# Patient Record
Sex: MALE | Race: WHITE | NOT HISPANIC OR LATINO
[De-identification: names, ages, dates, MRNs, and addresses within clinical notes are randomized per-mention and may not be internally consistent; named-entity substitution may affect disease eponyms.]

---

## 2017-05-22 ENCOUNTER — APPOINTMENT (OUTPATIENT)
Dept: SURGICAL ONCOLOGY | Facility: CLINIC | Age: 57
End: 2017-05-22

## 2017-05-22 VITALS
OXYGEN SATURATION: 96 % | HEART RATE: 59 BPM | BODY MASS INDEX: 27.59 KG/M2 | WEIGHT: 215 LBS | SYSTOLIC BLOOD PRESSURE: 116 MMHG | DIASTOLIC BLOOD PRESSURE: 77 MMHG | HEIGHT: 74 IN

## 2017-06-07 ENCOUNTER — RESULT REVIEW (OUTPATIENT)
Age: 57
End: 2017-06-07

## 2017-06-07 ENCOUNTER — OUTPATIENT (OUTPATIENT)
Dept: OUTPATIENT SERVICES | Facility: HOSPITAL | Age: 57
LOS: 1 days | End: 2017-06-07
Payer: COMMERCIAL

## 2017-06-07 DIAGNOSIS — D48.5 NEOPLASM OF UNCERTAIN BEHAVIOR OF SKIN: ICD-10-CM

## 2017-06-07 LAB — SURGICAL PATHOLOGY STUDY: SIGNIFICANT CHANGE UP

## 2017-06-07 PROCEDURE — 88321 CONSLTJ&REPRT SLD PREP ELSWR: CPT

## 2017-08-02 ENCOUNTER — FORM ENCOUNTER (OUTPATIENT)
Age: 57
End: 2017-08-02

## 2017-08-03 ENCOUNTER — OUTPATIENT (OUTPATIENT)
Dept: OUTPATIENT SERVICES | Facility: HOSPITAL | Age: 57
LOS: 1 days | End: 2017-08-03
Payer: COMMERCIAL

## 2017-08-03 VITALS
HEART RATE: 62 BPM | OXYGEN SATURATION: 98 % | SYSTOLIC BLOOD PRESSURE: 110 MMHG | DIASTOLIC BLOOD PRESSURE: 70 MMHG | WEIGHT: 214.07 LBS | TEMPERATURE: 99 F | HEIGHT: 74 IN | RESPIRATION RATE: 16 BRPM

## 2017-08-03 DIAGNOSIS — K08.409 PARTIAL LOSS OF TEETH, UNSPECIFIED CAUSE, UNSPECIFIED CLASS: Chronic | ICD-10-CM

## 2017-08-03 DIAGNOSIS — L81.8 OTHER SPECIFIED DISORDERS OF PIGMENTATION: ICD-10-CM

## 2017-08-03 DIAGNOSIS — Z90.89 ACQUIRED ABSENCE OF OTHER ORGANS: Chronic | ICD-10-CM

## 2017-08-03 DIAGNOSIS — D22.9 MELANOCYTIC NEVI, UNSPECIFIED: ICD-10-CM

## 2017-08-03 DIAGNOSIS — Z01.818 ENCOUNTER FOR OTHER PREPROCEDURAL EXAMINATION: ICD-10-CM

## 2017-08-03 LAB
ALBUMIN SERPL ELPH-MCNC: 4.6 G/DL — SIGNIFICANT CHANGE UP (ref 3.3–5)
ALP SERPL-CCNC: 76 U/L — SIGNIFICANT CHANGE UP (ref 40–120)
ALT FLD-CCNC: 40 U/L — SIGNIFICANT CHANGE UP (ref 10–45)
ANION GAP SERPL CALC-SCNC: 16 MMOL/L — SIGNIFICANT CHANGE UP (ref 5–17)
AST SERPL-CCNC: 29 U/L — SIGNIFICANT CHANGE UP (ref 10–40)
BILIRUB SERPL-MCNC: 0.9 MG/DL — SIGNIFICANT CHANGE UP (ref 0.2–1.2)
BUN SERPL-MCNC: 26 MG/DL — HIGH (ref 7–23)
CALCIUM SERPL-MCNC: 9.6 MG/DL — SIGNIFICANT CHANGE UP (ref 8.4–10.5)
CHLORIDE SERPL-SCNC: 105 MMOL/L — SIGNIFICANT CHANGE UP (ref 96–108)
CO2 SERPL-SCNC: 22 MMOL/L — SIGNIFICANT CHANGE UP (ref 22–31)
CREAT SERPL-MCNC: 1.01 MG/DL — SIGNIFICANT CHANGE UP (ref 0.5–1.3)
GLUCOSE SERPL-MCNC: 96 MG/DL — SIGNIFICANT CHANGE UP (ref 70–99)
HCT VFR BLD CALC: 40.1 % — SIGNIFICANT CHANGE UP (ref 39–50)
HGB BLD-MCNC: 13.6 G/DL — SIGNIFICANT CHANGE UP (ref 13–17)
MCHC RBC-ENTMCNC: 30.6 PG — SIGNIFICANT CHANGE UP (ref 27–34)
MCHC RBC-ENTMCNC: 33.9 GM/DL — SIGNIFICANT CHANGE UP (ref 32–36)
MCV RBC AUTO: 90.3 FL — SIGNIFICANT CHANGE UP (ref 80–100)
PLATELET # BLD AUTO: 208 K/UL — SIGNIFICANT CHANGE UP (ref 150–400)
POTASSIUM SERPL-MCNC: 4.1 MMOL/L — SIGNIFICANT CHANGE UP (ref 3.5–5.3)
POTASSIUM SERPL-SCNC: 4.1 MMOL/L — SIGNIFICANT CHANGE UP (ref 3.5–5.3)
PROT SERPL-MCNC: 7.4 G/DL — SIGNIFICANT CHANGE UP (ref 6–8.3)
RBC # BLD: 4.44 M/UL — SIGNIFICANT CHANGE UP (ref 4.2–5.8)
RBC # FLD: 13.9 % — SIGNIFICANT CHANGE UP (ref 10.3–14.5)
SODIUM SERPL-SCNC: 143 MMOL/L — SIGNIFICANT CHANGE UP (ref 135–145)
WBC # BLD: 4.83 K/UL — SIGNIFICANT CHANGE UP (ref 3.8–10.5)
WBC # FLD AUTO: 4.83 K/UL — SIGNIFICANT CHANGE UP (ref 3.8–10.5)

## 2017-08-03 PROCEDURE — G0463: CPT

## 2017-08-03 PROCEDURE — 85027 COMPLETE CBC AUTOMATED: CPT

## 2017-08-03 PROCEDURE — 71046 X-RAY EXAM CHEST 2 VIEWS: CPT

## 2017-08-03 PROCEDURE — 71020: CPT | Mod: 26

## 2017-08-03 PROCEDURE — 80053 COMPREHEN METABOLIC PANEL: CPT

## 2017-08-03 RX ORDER — CELECOXIB 200 MG/1
200 CAPSULE ORAL ONCE
Qty: 0 | Refills: 0 | Status: COMPLETED | OUTPATIENT
Start: 2017-08-17 | End: 2017-08-17

## 2017-08-03 RX ORDER — ACETAMINOPHEN 500 MG
975 TABLET ORAL ONCE
Qty: 0 | Refills: 0 | Status: COMPLETED | OUTPATIENT
Start: 2017-08-17 | End: 2017-08-17

## 2017-08-03 RX ORDER — LIDOCAINE HCL 20 MG/ML
0.2 VIAL (ML) INJECTION ONCE
Qty: 0 | Refills: 0 | Status: DISCONTINUED | OUTPATIENT
Start: 2017-08-17 | End: 2017-09-01

## 2017-08-03 RX ORDER — SODIUM CHLORIDE 9 MG/ML
3 INJECTION INTRAMUSCULAR; INTRAVENOUS; SUBCUTANEOUS EVERY 8 HOURS
Qty: 0 | Refills: 0 | Status: DISCONTINUED | OUTPATIENT
Start: 2017-08-17 | End: 2017-09-01

## 2017-08-03 NOTE — H&P PST ADULT - ATTENDING COMMENTS
57-year-old man with dysplastic nevi of his left back and flank scheduled for appropriate excision with reconstruction by Dr. Pina.  This was discussed with him in my office and again the morning of surgery.  All questions answered.  Consent on chart

## 2017-08-03 NOTE — H&P PST ADULT - PMH
Arthritis  left knee  Dyslipidemia    Esophageal stricture    GERD (gastroesophageal reflux disease)    Nevus  left upper back  Peyronie's disease

## 2017-08-03 NOTE — H&P PST ADULT - NSANTHOSAYNRD_GEN_A_CORE
No. CHINO screening performed.  STOP BANG Legend: 0-2 = LOW Risk; 3-4 = INTERMEDIATE Risk; 5-8 = HIGH Risk

## 2017-08-03 NOTE — H&P PST ADULT - HISTORY OF PRESENT ILLNESS
57 year old male with h/o GERD, dyslipidemia had routine  dermatology evaluation- s/p biopsy left back lesions- scheduled for wide excision cyst plastic nevus with atypia left upper back and left flank on 08/17/2017 57 year old male with h/o GERD, dyslipidemia had routine  dermatology evaluation- s/p biopsy left back lesions- scheduled for wide excision cyst plastic nevus with atypia left upper back and left flank reconstruction on 08/17/2017

## 2017-08-03 NOTE — H&P PST ADULT - PROBLEM SELECTOR PLAN 1
Wide excision cyst plastic nevus with atypia left upper back and left flank Wide excision cyst plastic nevus with atypia left upper back and left flank reconstruction

## 2017-08-16 NOTE — ASU DISCHARGE PLAN (ADULT/PEDIATRIC). - NOTIFY
Numbness, color, or temperature change to extremity/Pain not relieved by Medications/Excessive Diarrhea/Increased Irritability or Sluggishness/Bleeding that does not stop/Fever greater than 101/Inability to Tolerate Liquids or Foods/Swelling that continues/Unable to Urinate/Persistent Nausea and Vomiting/Numbness, tingling

## 2017-08-16 NOTE — BRIEF OPERATIVE NOTE - PROCEDURE
Excision  08/16/2017  Dysplastic nevi of left back and flank with reconstruction by Dr. Silvana AHMADIEG Excision of mass  08/17/2017  left back (3 cm) with plastics (Dr Pina)  Active  MBEG

## 2017-08-16 NOTE — ASU DISCHARGE PLAN (ADULT/PEDIATRIC). - NURSING INSTRUCTIONS
stay home for next 24 hrs,  no driving,no alcohol,no smoking for 24 hrs  eat lite to start and then resume your diet. call your Dr for f/u appointment.f/u with your drs printed instructions, given.

## 2017-08-16 NOTE — ASU DISCHARGE PLAN (ADULT/PEDIATRIC). - MEDICATION SUMMARY - MEDICATIONS TO TAKE
I will START or STAY ON the medications listed below when I get home from the hospital:    Percocet 5/325 325 mg-5 mg oral tablet  -- 1-2 tab(s) by mouth every 4-6 hours, As Needed -for severe pain MDD:8  -- Caution federal law prohibits the transfer of this drug to any person other  than the person for whom it was prescribed.  May cause drowsiness.  Alcohol may intensify this effect.  Use care when operating dangerous machinery.  This prescription cannot be refilled.  This product contains acetaminophen.  Do not use  with any other product containing acetaminophen to prevent possible liver damage.  Using more of this medication than prescribed may cause serious breathing problems.    -- Indication: For Pain    atorvastatin 10 mg oral tablet  -- 1 tab(s) by mouth once a day  -- Indication: For Home medication    omeprazole 20 mg oral delayed release capsule  -- 1 cap(s) by mouth once a day  -- Indication: For Home mediication

## 2017-08-16 NOTE — BRIEF OPERATIVE NOTE - SPECIMENS
Skin and soft tissue left back and left flank Skin and soft tissue left back and left flank, left back mass

## 2017-08-16 NOTE — BRIEF OPERATIVE NOTE - PRE-OP DX
Dysplastic nevi  08/16/2017  left back and flank  Active  Beg, Eric ROJAS Dysplastic nevi  08/16/2017  left back and flank  Active  Eric Wilson  Mass of torso  08/17/2017  3 cm back mass  Active  Eric Wilson H

## 2017-08-16 NOTE — BRIEF OPERATIVE NOTE - POST-OP DX
Dysplastic nevi  08/16/2017  left back and flank  Active  Beg, Eric ROJAS Dysplastic nevi  08/16/2017  left back and flank  Active  Eric Wilson  Mass of torso  08/17/2017  3 cm left back mass  Active  Eric Wilson H

## 2017-08-16 NOTE — ASU DISCHARGE PLAN (ADULT/PEDIATRIC). - YOU WERE IN THE HOSPITAL FOR:
Excision of dysplastic nevi from left back and flank with reconstruction Excision of dysplastic nevi from left back and flank, excision left back mass,  with reconstruction (Dr Pina)

## 2017-08-16 NOTE — ASU DISCHARGE PLAN (ADULT/PEDIATRIC). - ITEMS TO FOLLOWUP WITH YOUR PHYSICIAN'S
Dr. Wilson should call with pathology report by August 28 Dr. Wilson should call with pathology report by August 28.    Please follow up with Dr. Pina within x1 week after discharge from the hospital. You may call [phone] to schedule an appointment.

## 2017-08-16 NOTE — ASU DISCHARGE PLAN (ADULT/PEDIATRIC). - SPECIAL INSTRUCTIONS
See Dr. Pina next week Resume normal diet. Avoid straining, exercise, or heavy lifting.    Take medications as instructed by prescriptions.    OK to shower in 24 hours, your dressings can get wet. Otherwise, sponge bathing is ok.     Follow-up with primary care doctor as well.     Call 911 and return to the ED for chest pain, shortness of breath, significant increase in pain, or significant change in color of surgical sites.

## 2017-08-17 ENCOUNTER — APPOINTMENT (OUTPATIENT)
Dept: SURGICAL ONCOLOGY | Facility: HOSPITAL | Age: 57
End: 2017-08-17

## 2017-08-17 ENCOUNTER — TRANSCRIPTION ENCOUNTER (OUTPATIENT)
Age: 57
End: 2017-08-17

## 2017-08-17 ENCOUNTER — RESULT REVIEW (OUTPATIENT)
Age: 57
End: 2017-08-17

## 2017-08-17 ENCOUNTER — OUTPATIENT (OUTPATIENT)
Dept: OUTPATIENT SERVICES | Facility: HOSPITAL | Age: 57
LOS: 1 days | End: 2017-08-17
Payer: COMMERCIAL

## 2017-08-17 VITALS
SYSTOLIC BLOOD PRESSURE: 110 MMHG | HEART RATE: 62 BPM | WEIGHT: 214.07 LBS | TEMPERATURE: 99 F | HEIGHT: 74 IN | OXYGEN SATURATION: 98 % | DIASTOLIC BLOOD PRESSURE: 70 MMHG | RESPIRATION RATE: 16 BRPM

## 2017-08-17 VITALS
TEMPERATURE: 98 F | DIASTOLIC BLOOD PRESSURE: 70 MMHG | RESPIRATION RATE: 16 BRPM | SYSTOLIC BLOOD PRESSURE: 121 MMHG | HEART RATE: 61 BPM | OXYGEN SATURATION: 98 %

## 2017-08-17 DIAGNOSIS — L81.8 OTHER SPECIFIED DISORDERS OF PIGMENTATION: ICD-10-CM

## 2017-08-17 DIAGNOSIS — K08.409 PARTIAL LOSS OF TEETH, UNSPECIFIED CAUSE, UNSPECIFIED CLASS: Chronic | ICD-10-CM

## 2017-08-17 DIAGNOSIS — Z01.818 ENCOUNTER FOR OTHER PREPROCEDURAL EXAMINATION: ICD-10-CM

## 2017-08-17 DIAGNOSIS — Z90.89 ACQUIRED ABSENCE OF OTHER ORGANS: Chronic | ICD-10-CM

## 2017-08-17 PROCEDURE — 88305 TISSUE EXAM BY PATHOLOGIST: CPT

## 2017-08-17 PROCEDURE — 88305 TISSUE EXAM BY PATHOLOGIST: CPT | Mod: 26

## 2017-08-17 PROCEDURE — 21930 EXC BACK LES SC < 3 CM: CPT

## 2017-08-17 PROCEDURE — 21931 EXC BACK LES SC 3 CM/>: CPT

## 2017-08-17 PROCEDURE — 11406 EXC TR-EXT B9+MARG >4.0 CM: CPT

## 2017-08-17 RX ORDER — SODIUM CHLORIDE 9 MG/ML
1000 INJECTION, SOLUTION INTRAVENOUS
Qty: 0 | Refills: 0 | Status: DISCONTINUED | OUTPATIENT
Start: 2017-08-17 | End: 2017-09-01

## 2017-08-17 RX ORDER — OXYCODONE HYDROCHLORIDE 5 MG/1
10 TABLET ORAL ONCE
Qty: 0 | Refills: 0 | Status: DISCONTINUED | OUTPATIENT
Start: 2017-08-17 | End: 2017-08-17

## 2017-08-17 RX ORDER — ONDANSETRON 8 MG/1
4 TABLET, FILM COATED ORAL ONCE
Qty: 0 | Refills: 0 | Status: DISCONTINUED | OUTPATIENT
Start: 2017-08-17 | End: 2017-09-01

## 2017-08-17 RX ORDER — OXYCODONE HYDROCHLORIDE 5 MG/1
5 TABLET ORAL ONCE
Qty: 0 | Refills: 0 | Status: DISCONTINUED | OUTPATIENT
Start: 2017-08-17 | End: 2017-08-17

## 2017-08-17 RX ORDER — CELECOXIB 200 MG/1
200 CAPSULE ORAL ONCE
Qty: 0 | Refills: 0 | Status: DISCONTINUED | OUTPATIENT
Start: 2017-08-17 | End: 2017-09-01

## 2017-08-17 RX ADMIN — CELECOXIB 200 MILLIGRAM(S): 200 CAPSULE ORAL at 06:34

## 2017-08-17 RX ADMIN — Medication 975 MILLIGRAM(S): at 06:34

## 2017-08-23 ENCOUNTER — TRANSCRIPTION ENCOUNTER (OUTPATIENT)
Age: 57
End: 2017-08-23

## 2017-08-23 LAB — SURGICAL PATHOLOGY STUDY: SIGNIFICANT CHANGE UP

## 2017-08-25 ENCOUNTER — APPOINTMENT (OUTPATIENT)
Dept: PLASTIC SURGERY | Facility: CLINIC | Age: 57
End: 2017-08-25
Payer: COMMERCIAL

## 2017-08-25 DIAGNOSIS — L81.8 OTHER SPECIFIED DISORDERS OF PIGMENTATION: ICD-10-CM

## 2017-08-25 PROCEDURE — 99024 POSTOP FOLLOW-UP VISIT: CPT

## 2017-09-01 ENCOUNTER — APPOINTMENT (OUTPATIENT)
Dept: PLASTIC SURGERY | Facility: CLINIC | Age: 57
End: 2017-09-01
Payer: COMMERCIAL

## 2017-09-01 PROCEDURE — 99024 POSTOP FOLLOW-UP VISIT: CPT

## 2017-10-05 ENCOUNTER — APPOINTMENT (OUTPATIENT)
Dept: PLASTIC SURGERY | Facility: CLINIC | Age: 57
End: 2017-10-05
Payer: COMMERCIAL

## 2017-10-05 PROCEDURE — 99024 POSTOP FOLLOW-UP VISIT: CPT

## 2017-10-18 PROBLEM — L81.8 ATYPICAL MELANOCYTIC HYPERPLASIA: Status: ACTIVE | Noted: 2017-05-22

## 2017-12-07 ENCOUNTER — APPOINTMENT (OUTPATIENT)
Dept: VASCULAR SURGERY | Facility: CLINIC | Age: 57
End: 2017-12-07

## 2018-02-08 ENCOUNTER — APPOINTMENT (OUTPATIENT)
Dept: VASCULAR SURGERY | Facility: CLINIC | Age: 58
End: 2018-02-08
Payer: COMMERCIAL

## 2018-02-08 VITALS
HEIGHT: 74 IN | BODY MASS INDEX: 26.95 KG/M2 | SYSTOLIC BLOOD PRESSURE: 102 MMHG | TEMPERATURE: 97.6 F | DIASTOLIC BLOOD PRESSURE: 69 MMHG | WEIGHT: 210 LBS | HEART RATE: 71 BPM

## 2018-02-08 PROCEDURE — 93970 EXTREMITY STUDY: CPT

## 2018-02-08 PROCEDURE — 99203 OFFICE O/P NEW LOW 30 MIN: CPT | Mod: 25

## 2018-04-18 ENCOUNTER — APPOINTMENT (OUTPATIENT)
Dept: VASCULAR SURGERY | Facility: CLINIC | Age: 58
End: 2018-04-18
Payer: COMMERCIAL

## 2018-04-18 PROCEDURE — 37765 STAB PHLEB VEINS XTR 10-20: CPT | Mod: RT

## 2018-04-18 PROCEDURE — 36478 ENDOVENOUS LASER 1ST VEIN: CPT | Mod: RT

## 2018-04-20 ENCOUNTER — APPOINTMENT (OUTPATIENT)
Dept: VASCULAR SURGERY | Facility: CLINIC | Age: 58
End: 2018-04-20
Payer: COMMERCIAL

## 2018-04-20 PROCEDURE — 93971 EXTREMITY STUDY: CPT

## 2018-04-26 ENCOUNTER — APPOINTMENT (OUTPATIENT)
Dept: VASCULAR SURGERY | Facility: CLINIC | Age: 58
End: 2018-04-26
Payer: COMMERCIAL

## 2018-04-26 VITALS
HEART RATE: 67 BPM | DIASTOLIC BLOOD PRESSURE: 78 MMHG | SYSTOLIC BLOOD PRESSURE: 117 MMHG | WEIGHT: 210 LBS | HEIGHT: 74 IN | BODY MASS INDEX: 26.95 KG/M2 | TEMPERATURE: 98.2 F

## 2018-04-26 PROCEDURE — 99024 POSTOP FOLLOW-UP VISIT: CPT

## 2018-04-26 PROCEDURE — 93971 EXTREMITY STUDY: CPT

## 2018-05-10 ENCOUNTER — APPOINTMENT (OUTPATIENT)
Dept: VASCULAR SURGERY | Facility: CLINIC | Age: 58
End: 2018-05-10

## 2018-07-12 ENCOUNTER — APPOINTMENT (OUTPATIENT)
Dept: VASCULAR SURGERY | Facility: CLINIC | Age: 58
End: 2018-07-12

## 2018-07-23 PROBLEM — D22.9 MELANOCYTIC NEVI, UNSPECIFIED: Chronic | Status: ACTIVE | Noted: 2017-08-03

## 2018-07-23 PROBLEM — E78.5 HYPERLIPIDEMIA, UNSPECIFIED: Chronic | Status: ACTIVE | Noted: 2017-08-03

## 2018-09-27 ENCOUNTER — APPOINTMENT (OUTPATIENT)
Dept: VASCULAR SURGERY | Facility: CLINIC | Age: 58
End: 2018-09-27
Payer: COMMERCIAL

## 2018-09-27 VITALS
WEIGHT: 210 LBS | DIASTOLIC BLOOD PRESSURE: 70 MMHG | HEART RATE: 72 BPM | TEMPERATURE: 98.1 F | HEIGHT: 74 IN | SYSTOLIC BLOOD PRESSURE: 114 MMHG | BODY MASS INDEX: 26.95 KG/M2

## 2018-09-27 PROCEDURE — 99212 OFFICE O/P EST SF 10 MIN: CPT

## 2019-01-20 ENCOUNTER — TRANSCRIPTION ENCOUNTER (OUTPATIENT)
Age: 59
End: 2019-01-20

## 2019-03-29 ENCOUNTER — TRANSCRIPTION ENCOUNTER (OUTPATIENT)
Age: 59
End: 2019-03-29

## 2019-10-21 ENCOUNTER — APPOINTMENT (OUTPATIENT)
Dept: VASCULAR SURGERY | Facility: CLINIC | Age: 59
End: 2019-10-21
Payer: COMMERCIAL

## 2019-10-21 VITALS
HEIGHT: 74 IN | WEIGHT: 205 LBS | BODY MASS INDEX: 26.31 KG/M2 | SYSTOLIC BLOOD PRESSURE: 102 MMHG | TEMPERATURE: 97.5 F | HEART RATE: 67 BPM | DIASTOLIC BLOOD PRESSURE: 61 MMHG

## 2019-10-21 DIAGNOSIS — I83.893 VARICOSE VEINS OF BILATERAL LOWER EXTREMITIES WITH OTHER COMPLICATIONS: ICD-10-CM

## 2019-10-21 PROCEDURE — 99213 OFFICE O/P EST LOW 20 MIN: CPT

## 2019-10-21 PROCEDURE — 93970 EXTREMITY STUDY: CPT

## 2019-10-21 RX ORDER — HYDROCODONE BITARTRATE AND ACETAMINOPHEN 5; 325 MG/1; MG/1
5-325 TABLET ORAL
Qty: 8 | Refills: 0 | Status: DISCONTINUED | COMMUNITY
Start: 2017-07-27 | End: 2019-10-21

## 2019-10-21 RX ORDER — IBUPROFEN 800 MG/1
800 TABLET, FILM COATED ORAL
Qty: 20 | Refills: 0 | Status: DISCONTINUED | COMMUNITY
Start: 2017-07-27 | End: 2019-10-21

## 2019-10-21 RX ORDER — BENZONATATE 100 MG/1
100 CAPSULE ORAL
Qty: 90 | Refills: 0 | Status: DISCONTINUED | COMMUNITY
Start: 2017-05-22 | End: 2019-10-21

## 2019-10-21 RX ORDER — SODIUM SULFATE, POTASSIUM SULFATE, MAGNESIUM SULFATE 17.5; 3.13; 1.6 G/ML; G/ML; G/ML
17.5-3.13-1.6 SOLUTION, CONCENTRATE ORAL
Qty: 354 | Refills: 0 | Status: DISCONTINUED | COMMUNITY
Start: 2017-07-21 | End: 2019-10-21

## 2019-10-21 RX ORDER — OMEPRAZOLE 20 MG/1
20 CAPSULE, DELAYED RELEASE ORAL
Qty: 90 | Refills: 0 | Status: DISCONTINUED | COMMUNITY
Start: 2017-07-21 | End: 2019-10-21

## 2019-10-21 RX ORDER — AZITHROMYCIN 250 MG/1
250 TABLET, FILM COATED ORAL
Qty: 6 | Refills: 0 | Status: DISCONTINUED | COMMUNITY
Start: 2017-05-22 | End: 2019-10-21

## 2019-10-21 RX ORDER — OXYCODONE AND ACETAMINOPHEN 5; 325 MG/1; MG/1
5-325 TABLET ORAL
Qty: 20 | Refills: 0 | Status: DISCONTINUED | COMMUNITY
Start: 2017-08-17 | End: 2019-10-21

## 2019-10-21 NOTE — PHYSICAL EXAM
[2+] : left 2+ [Varicose Veins Of The Right Leg] : of the right leg [Varicose Veins Of Lower Extremities] : present [] : bilaterally [Ankle Swelling On The Right] : mild [No Rash or Lesion] : No rash or lesion [Alert] : alert [Oriented to Place] : oriented to place [Oriented to Person] : oriented to person [Oriented to Time] : oriented to time [Calm] : calm [Ankle Swelling (On Exam)] : not present [de-identified] : well in NAD  [FreeTextEntry1] : Bilateral lower extremities w/ diffuse spider and reticular veins. Right leg very mild non tender varicosities in posterior calf. Well healed stab incisions. No open wounds.  [de-identified] : MALISSAL [de-identified] : Cooperative

## 2019-10-21 NOTE — ASSESSMENT
[Arterial/Venous Disease] : arterial/venous disease [Other: _____] : [unfilled] [FreeTextEntry1] : 58 y/o m w/ venous insufficiency s/p right leg GSV ablation an stab phlebectomies doing well w/out any complaints. \par \par Medical Conservative Management leg elevation prn, diet and weight loss, skin moisturizer and knee high compression stocking 20-30mmHg \par He may return to the office on an as needed basis or sooner if he develops any new symptoms

## 2019-10-21 NOTE — DATA REVIEWED
[FreeTextEntry1] : 10/21/19 Venous Doppler: Bilateral negative DVT/SVT. Right GSV ablated. Reflux in vein of giacomini/SSV, 3.4mm at the prox. SSV not seen in mid-prox calf. Left no reflux found.

## 2019-10-21 NOTE — HISTORY OF PRESENT ILLNESS
[FreeTextEntry1] : 60 y/o m w/ known venous insufficiency s/p right GSV ablation and stab phlebectomies in April 2018. He is doing well and denies any new complaints. No new medical problems since previous visit. Partially compliant with compression stockings. Denies pain or leg swelling. Denies claudication.

## 2019-12-10 NOTE — ASU DISCHARGE PLAN (ADULT/PEDIATRIC). - TELE NUMBER
Dr Wilson:[phone]
FAMILY HISTORY:  Family history of DVT, PE father  FH: breast cancer, maternal grandmother  FH: diabetes mellitus, paternal grandmother  FH: heart disease, maternal grandparents

## 2020-07-29 ENCOUNTER — TRANSCRIPTION ENCOUNTER (OUTPATIENT)
Age: 60
End: 2020-07-29

## 2020-10-10 ENCOUNTER — TRANSCRIPTION ENCOUNTER (OUTPATIENT)
Age: 60
End: 2020-10-10

## 2021-02-17 ENCOUNTER — INPATIENT (INPATIENT)
Facility: HOSPITAL | Age: 61
LOS: 1 days | Discharge: HOME | DRG: 299 | End: 2021-02-19
Attending: INTERNAL MEDICINE | Admitting: INTERNAL MEDICINE
Payer: COMMERCIAL

## 2021-02-17 VITALS — HEIGHT: 74 IN | WEIGHT: 210.1 LBS | OXYGEN SATURATION: 92 % | HEART RATE: 97 BPM

## 2021-02-17 DIAGNOSIS — I82.411 ACUTE EMBOLISM AND THROMBOSIS OF RIGHT FEMORAL VEIN: ICD-10-CM

## 2021-02-17 DIAGNOSIS — N48.6 INDURATION PENIS PLASTICA: ICD-10-CM

## 2021-02-17 DIAGNOSIS — M13.862 OTHER SPECIFIED ARTHRITIS, LEFT KNEE: ICD-10-CM

## 2021-02-17 DIAGNOSIS — I26.93 SINGLE SUBSEGMENTAL PULMONARY EMBOLISM WITHOUT ACUTE COR PULMONALE: ICD-10-CM

## 2021-02-17 DIAGNOSIS — E78.5 HYPERLIPIDEMIA, UNSPECIFIED: ICD-10-CM

## 2021-02-17 DIAGNOSIS — R73.9 HYPERGLYCEMIA, UNSPECIFIED: ICD-10-CM

## 2021-02-17 DIAGNOSIS — I26.99 OTHER PULMONARY EMBOLISM WITHOUT ACUTE COR PULMONALE: ICD-10-CM

## 2021-02-17 DIAGNOSIS — Z90.89 ACQUIRED ABSENCE OF OTHER ORGANS: Chronic | ICD-10-CM

## 2021-02-17 DIAGNOSIS — K21.9 GASTRO-ESOPHAGEAL REFLUX DISEASE WITHOUT ESOPHAGITIS: ICD-10-CM

## 2021-02-17 DIAGNOSIS — E78.00 PURE HYPERCHOLESTEROLEMIA, UNSPECIFIED: ICD-10-CM

## 2021-02-17 DIAGNOSIS — N40.0 BENIGN PROSTATIC HYPERPLASIA WITHOUT LOWER URINARY TRACT SYMPTOMS: ICD-10-CM

## 2021-02-17 DIAGNOSIS — S82.891D OTHER FRACTURE OF RIGHT LOWER LEG, SUBSEQUENT ENCOUNTER FOR CLOSED FRACTURE WITH ROUTINE HEALING: ICD-10-CM

## 2021-02-17 DIAGNOSIS — K08.409 PARTIAL LOSS OF TEETH, UNSPECIFIED CAUSE, UNSPECIFIED CLASS: Chronic | ICD-10-CM

## 2021-02-17 LAB
ADD ON TEST-SPECIMEN IN LAB: SIGNIFICANT CHANGE UP
ALBUMIN SERPL ELPH-MCNC: 3.5 G/DL — SIGNIFICANT CHANGE UP (ref 3.3–5)
ALP SERPL-CCNC: 121 U/L — HIGH (ref 40–120)
ALT FLD-CCNC: 149 U/L — HIGH (ref 12–78)
ANION GAP SERPL CALC-SCNC: 7 MMOL/L — SIGNIFICANT CHANGE UP (ref 5–17)
APTT BLD: 31.7 SEC — SIGNIFICANT CHANGE UP (ref 27.5–35.5)
AST SERPL-CCNC: 76 U/L — HIGH (ref 15–37)
BASOPHILS # BLD AUTO: 0.05 K/UL — SIGNIFICANT CHANGE UP (ref 0–0.2)
BASOPHILS NFR BLD AUTO: 0.4 % — SIGNIFICANT CHANGE UP (ref 0–2)
BILIRUB SERPL-MCNC: 1 MG/DL — SIGNIFICANT CHANGE UP (ref 0.2–1.2)
BUN SERPL-MCNC: 16 MG/DL — SIGNIFICANT CHANGE UP (ref 7–23)
CALCIUM SERPL-MCNC: 9.1 MG/DL — SIGNIFICANT CHANGE UP (ref 8.5–10.1)
CHLORIDE SERPL-SCNC: 100 MMOL/L — SIGNIFICANT CHANGE UP (ref 96–108)
CO2 SERPL-SCNC: 28 MMOL/L — SIGNIFICANT CHANGE UP (ref 22–31)
CREAT SERPL-MCNC: 1.01 MG/DL — SIGNIFICANT CHANGE UP (ref 0.5–1.3)
D DIMER BLD IA.RAPID-MCNC: 1274 NG/ML DDU — HIGH
EOSINOPHIL # BLD AUTO: 0.06 K/UL — SIGNIFICANT CHANGE UP (ref 0–0.5)
EOSINOPHIL NFR BLD AUTO: 0.5 % — SIGNIFICANT CHANGE UP (ref 0–6)
GLUCOSE SERPL-MCNC: 112 MG/DL — HIGH (ref 70–99)
HCT VFR BLD CALC: 42.7 % — SIGNIFICANT CHANGE UP (ref 39–50)
HGB BLD-MCNC: 14.5 G/DL — SIGNIFICANT CHANGE UP (ref 13–17)
IMM GRANULOCYTES NFR BLD AUTO: 0.3 % — SIGNIFICANT CHANGE UP (ref 0–1.5)
INR BLD: 1.33 RATIO — HIGH (ref 0.88–1.16)
LYMPHOCYTES # BLD AUTO: 0.87 K/UL — LOW (ref 1–3.3)
LYMPHOCYTES # BLD AUTO: 7.2 % — LOW (ref 13–44)
MCHC RBC-ENTMCNC: 31.7 PG — SIGNIFICANT CHANGE UP (ref 27–34)
MCHC RBC-ENTMCNC: 34 GM/DL — SIGNIFICANT CHANGE UP (ref 32–36)
MCV RBC AUTO: 93.2 FL — SIGNIFICANT CHANGE UP (ref 80–100)
MONOCYTES # BLD AUTO: 1.1 K/UL — HIGH (ref 0–0.9)
MONOCYTES NFR BLD AUTO: 9.1 % — SIGNIFICANT CHANGE UP (ref 2–14)
NEUTROPHILS # BLD AUTO: 9.92 K/UL — HIGH (ref 1.8–7.4)
NEUTROPHILS NFR BLD AUTO: 82.5 % — HIGH (ref 43–77)
PLATELET # BLD AUTO: 272 K/UL — SIGNIFICANT CHANGE UP (ref 150–400)
POTASSIUM SERPL-MCNC: 3.9 MMOL/L — SIGNIFICANT CHANGE UP (ref 3.5–5.3)
POTASSIUM SERPL-SCNC: 3.9 MMOL/L — SIGNIFICANT CHANGE UP (ref 3.5–5.3)
PROT SERPL-MCNC: 8.2 GM/DL — SIGNIFICANT CHANGE UP (ref 6–8.3)
PROTHROM AB SERPL-ACNC: 15.3 SEC — HIGH (ref 10.6–13.6)
RBC # BLD: 4.58 M/UL — SIGNIFICANT CHANGE UP (ref 4.2–5.8)
RBC # FLD: 13.2 % — SIGNIFICANT CHANGE UP (ref 10.3–14.5)
SODIUM SERPL-SCNC: 135 MMOL/L — SIGNIFICANT CHANGE UP (ref 135–145)
TROPONIN I SERPL-MCNC: <0.015 NG/ML — SIGNIFICANT CHANGE UP (ref 0.01–0.04)
WBC # BLD: 12.04 K/UL — HIGH (ref 3.8–10.5)
WBC # FLD AUTO: 12.04 K/UL — HIGH (ref 3.8–10.5)

## 2021-02-17 PROCEDURE — 99497 ADVNCD CARE PLAN 30 MIN: CPT | Mod: 25

## 2021-02-17 PROCEDURE — 93010 ELECTROCARDIOGRAM REPORT: CPT

## 2021-02-17 PROCEDURE — 93306 TTE W/DOPPLER COMPLETE: CPT

## 2021-02-17 PROCEDURE — 87521 HEPATITIS C PROBE&RVRS TRNSC: CPT

## 2021-02-17 PROCEDURE — U0005: CPT

## 2021-02-17 PROCEDURE — 80053 COMPREHEN METABOLIC PANEL: CPT

## 2021-02-17 PROCEDURE — 84484 ASSAY OF TROPONIN QUANT: CPT

## 2021-02-17 PROCEDURE — 83735 ASSAY OF MAGNESIUM: CPT

## 2021-02-17 PROCEDURE — 85025 COMPLETE CBC W/AUTO DIFF WBC: CPT

## 2021-02-17 PROCEDURE — 93970 EXTREMITY STUDY: CPT | Mod: 26

## 2021-02-17 PROCEDURE — 86769 SARS-COV-2 COVID-19 ANTIBODY: CPT

## 2021-02-17 PROCEDURE — 85027 COMPLETE CBC AUTOMATED: CPT

## 2021-02-17 PROCEDURE — U0003: CPT

## 2021-02-17 PROCEDURE — 36415 COLL VENOUS BLD VENIPUNCTURE: CPT

## 2021-02-17 PROCEDURE — 99223 1ST HOSP IP/OBS HIGH 75: CPT

## 2021-02-17 PROCEDURE — 85730 THROMBOPLASTIN TIME PARTIAL: CPT

## 2021-02-17 PROCEDURE — 71275 CT ANGIOGRAPHY CHEST: CPT | Mod: 26

## 2021-02-17 PROCEDURE — 86803 HEPATITIS C AB TEST: CPT

## 2021-02-17 PROCEDURE — 84100 ASSAY OF PHOSPHORUS: CPT

## 2021-02-17 RX ORDER — OMEPRAZOLE 10 MG/1
1 CAPSULE, DELAYED RELEASE ORAL
Qty: 0 | Refills: 0 | DISCHARGE

## 2021-02-17 RX ORDER — HEPARIN SODIUM 5000 [USP'U]/ML
INJECTION INTRAVENOUS; SUBCUTANEOUS
Qty: 25000 | Refills: 0 | Status: DISCONTINUED | OUTPATIENT
Start: 2021-02-17 | End: 2021-02-18

## 2021-02-17 RX ORDER — HEPARIN SODIUM 5000 [USP'U]/ML
8000 INJECTION INTRAVENOUS; SUBCUTANEOUS EVERY 6 HOURS
Refills: 0 | Status: DISCONTINUED | OUTPATIENT
Start: 2021-02-17 | End: 2021-02-18

## 2021-02-17 RX ORDER — ACETAMINOPHEN 500 MG
650 TABLET ORAL EVERY 4 HOURS
Refills: 0 | Status: DISCONTINUED | OUTPATIENT
Start: 2021-02-17 | End: 2021-02-19

## 2021-02-17 RX ORDER — HEPARIN SODIUM 5000 [USP'U]/ML
4000 INJECTION INTRAVENOUS; SUBCUTANEOUS EVERY 6 HOURS
Refills: 0 | Status: DISCONTINUED | OUTPATIENT
Start: 2021-02-17 | End: 2021-02-18

## 2021-02-17 RX ORDER — ATORVASTATIN CALCIUM 80 MG/1
10 TABLET, FILM COATED ORAL AT BEDTIME
Refills: 0 | Status: DISCONTINUED | OUTPATIENT
Start: 2021-02-17 | End: 2021-02-19

## 2021-02-17 RX ORDER — HEPARIN SODIUM 5000 [USP'U]/ML
8000 INJECTION INTRAVENOUS; SUBCUTANEOUS ONCE
Refills: 0 | Status: COMPLETED | OUTPATIENT
Start: 2021-02-17 | End: 2021-02-17

## 2021-02-17 RX ORDER — FAMOTIDINE 10 MG/ML
10 INJECTION INTRAVENOUS DAILY
Refills: 0 | Status: DISCONTINUED | OUTPATIENT
Start: 2021-02-17 | End: 2021-02-19

## 2021-02-17 RX ORDER — TAMSULOSIN HYDROCHLORIDE 0.4 MG/1
0.8 CAPSULE ORAL AT BEDTIME
Refills: 0 | Status: DISCONTINUED | OUTPATIENT
Start: 2021-02-17 | End: 2021-02-19

## 2021-02-17 RX ORDER — SODIUM CHLORIDE 9 MG/ML
1000 INJECTION INTRAMUSCULAR; INTRAVENOUS; SUBCUTANEOUS ONCE
Refills: 0 | Status: COMPLETED | OUTPATIENT
Start: 2021-02-17 | End: 2021-02-17

## 2021-02-17 RX ADMIN — HEPARIN SODIUM 1800 UNIT(S)/HR: 5000 INJECTION INTRAVENOUS; SUBCUTANEOUS at 20:47

## 2021-02-17 RX ADMIN — SODIUM CHLORIDE 1000 MILLILITER(S): 9 INJECTION INTRAMUSCULAR; INTRAVENOUS; SUBCUTANEOUS at 18:45

## 2021-02-17 RX ADMIN — HEPARIN SODIUM 8000 UNIT(S): 5000 INJECTION INTRAVENOUS; SUBCUTANEOUS at 20:46

## 2021-02-17 RX ADMIN — SODIUM CHLORIDE 1000 MILLILITER(S): 9 INJECTION INTRAMUSCULAR; INTRAVENOUS; SUBCUTANEOUS at 16:42

## 2021-02-17 NOTE — ED STATDOCS - CRITICAL CARE ATTENDING CONTRIBUTION TO CARE
I, Aarti Phillips DO,  performed the initial face to face bedside interview with this patient regarding history of present illness, review of symptoms and relevant past medical, social and family history.  I completed an independent physical examination.  I was the initial provider who evaluated this patient. I have signed out the follow up of any pending tests (i.e. labs, radiological studies) to the ACP.  I have communicated the patient’s plan of care and disposition with the ACP.  The history, relevant review of systems, past medical and surgical history, medical decision making, and physical examination was documented by the scribe in my presence and I attest to the accuracy of the documentation.

## 2021-02-17 NOTE — ED STATDOCS - OBJECTIVE STATEMENT
61 y/o male with PMHx of HLD, Peyronie's disease, arthritis, GERD, esophageal stricture, shingles presents to the ED c/o chest pain. Pt states on 2/1, he broke his right ankle, was placed in boot at that time, with subsequent swelling. x4 days ago pt has his first COVID swab, began feeling left sided flank/chest pain which was pleuritic, and pain to the back of his right knee. Today, pt c/o SOB, decreased PO intake, dizziness. Non-smoker. Allergic to Levaquin. PMD: Dr. Troncoso, sent in today ro r/o DVT. 59 y/o male with PMHx of HLD, Peyronie's disease, arthritis, GERD, esophageal stricture, shingles presents to the ED c/o chest pain. Pt states on 2/1, he broke his right ankle, was placed in boot at that time, with subsequent swelling. x4 days ago pt has his first COVID swab, began feeling right sided flank/chest pain which was pleuritic, and pain to the back of his right knee. Today, pt c/o SOB, decreased PO intake, dizziness. Non-smoker. Allergic to Levaquin. PMD: Dr. Troncoso, sent in today ro r/o DVT. Orthopedist: Dr. Gale.

## 2021-02-17 NOTE — ED ADULT NURSE NOTE - OBJECTIVE STATEMENT
Patient presents to the ED c/o chest pain. Pt states on 2/1, he broke his right ankle, was placed in boot at that time, with subsequent swelling. x4 days ago pt has his first COVID swab, began feeling right sided flank/chest pain which was pleuritic, and pain to the back of his right knee. Today, pt c/o SOB, decreased PO intake, dizziness.

## 2021-02-17 NOTE — H&P ADULT - HISTORY OF PRESENT ILLNESS
Pt is a 59 yo male with a pmh/o HLD, esophageal stricture, GERD, OA, Peyronie's disease, shingles, recent right ankle fracture s/p fall on 2/1/21, who is currently in boot and with prolonged immobilization since incident, who was referred to ED by covering physician at PMD office due to concern for underlying DVT in setting of worsening right ankle/leg swelling over past three days. Pt also c/o right sided flank radiating to right sided chest pain that is 5/10, intermittent, aggravated by deep breaths, alleviated by rest and shallow breathing, no prior episodes, no cough, associated with exertional shortness of breath as well. Pt denies fevers, cough, rash, n/v/d, sick contacts, smoking, exogenous/OTC herbal supplements or hormone use, familial history of clotting/bleeding disorders, dysuria, diaphoresis, abd pain, hematuria.

## 2021-02-17 NOTE — ED STATDOCS - PROGRESS NOTE DETAILS
61 y/o male with PMHx of HLD, Peyronie's disease, arthritis, GERD, esophageal stricture, shingles presents to the ED c/o chest pain. Pt states on 2/1, he broke his right ankle, was placed in boot at that time, with subsequent swelling. x4 days ago pt has his first COVID swab, began feeling right sided flank/chest pain which was pleuritic, and pain to the back of his right knee. Today, pt c/o SOB, decreased PO intake, dizziness.  (+) Swelling to RLE.  CTA B.  Tachy.  O2 sat 94% on RA.   Likely DVT with PE s/p surgery.  Will F/U labs, imaging.  Trang Vaughan PA-C Jacqueline PELAEZ: patient found to have extensive PE and DVT; heparin bolus/gtt ordered; patient hemodynamically stable; endorsed to Dr. Joyce for admission.

## 2021-02-17 NOTE — ED STATDOCS - MUSCULOSKELETAL, MLM
range of motion is not limited and there is no muscle tenderness. range of motion is not limited +mild right calf TTP, swelling

## 2021-02-17 NOTE — ED STATDOCS - NS ED ATTENDING STATEMENT MOD
I have personally provided the amount of critical care time documented below excluding time spent on separate procedures.

## 2021-02-17 NOTE — H&P ADULT - NSICDXPASTMEDICALHX_GEN_ALL_CORE_FT
PAST MEDICAL HISTORY:  Arthritis left knee    Dyslipidemia     Esophageal stricture     GERD (gastroesophageal reflux disease)     Nevus left upper back    Peyronie's disease

## 2021-02-17 NOTE — H&P ADULT - ASSESSMENT
61 yo male with a pmh/o recent right ankle fracture s/p fall on 2/1/21, who is currently in boot and with prolonged immobilization since incident, who was refereed to ED by covering physician at PMD office due to concern for underlying DVT in setting of worsening right ankle/leg swelling associated with chest pain and dyspnea, over past three days, admitted due to:    #Atypical chest pain  admit to telemetry given possible RH strain on imaging and c/o chest pain  CP pleuritic, mostly attributed to multiple PE however concerning given finding on imaging  trend troponins, first set wnl  BnP wnl  EKG w/o STT elevation  f/u TTE  Cardiology consulted  cont statin    #Acute PE/DVT  cont heparin gtt  f/u coags  likely to be converted to oral AC on d/c    #Leukocytosis  likely reactive in setting of acute PE/DVT  no s/s infectious etiology, no PNA/urine or gi sx  monitor for fever, consider bld cx abx should occur    #Hyperglycemia  non fasting  monitor on serum chem  consider restriction pending trend    #Transaminitis  fatty infiltration noted on imaging  on statin  restricted diet  no s/s acute jin    #Right ankle fracture  OOB with assistance  cont to use crutches  fall/fx precautions    #HLD  DASH/TLC diet  cont statin-monitor LFTs    #GERD  cont pepcid    #Peyroinies/BPH  cont flomax

## 2021-02-17 NOTE — ED ADULT TRIAGE NOTE - CHIEF COMPLAINT QUOTE
chest pain and sob started 2/14/21. Pt had right ankle fx s/p fall on 2/1/21. Not on blood thinners. c/o left leg pain. Denies swellling or redness.

## 2021-02-18 DIAGNOSIS — R07.81 PLEURODYNIA: ICD-10-CM

## 2021-02-18 DIAGNOSIS — I82.409 ACUTE EMBOLISM AND THROMBOSIS OF UNSPECIFIED DEEP VEINS OF UNSPECIFIED LOWER EXTREMITY: ICD-10-CM

## 2021-02-18 DIAGNOSIS — I82.411 ACUTE EMBOLISM AND THROMBOSIS OF RIGHT FEMORAL VEIN: ICD-10-CM

## 2021-02-18 DIAGNOSIS — I26.94 MULTIPLE SUBSEGMENTAL PULMONARY EMBOLI WITHOUT ACUTE COR PULMONALE: ICD-10-CM

## 2021-02-18 DIAGNOSIS — I26.99 OTHER PULMONARY EMBOLISM WITHOUT ACUTE COR PULMONALE: ICD-10-CM

## 2021-02-18 DIAGNOSIS — S82.891A OTHER FRACTURE OF RIGHT LOWER LEG, INITIAL ENCOUNTER FOR CLOSED FRACTURE: ICD-10-CM

## 2021-02-18 LAB
ALBUMIN SERPL ELPH-MCNC: 3.1 G/DL — LOW (ref 3.3–5)
ALP SERPL-CCNC: 112 U/L — SIGNIFICANT CHANGE UP (ref 40–120)
ALT FLD-CCNC: 116 U/L — HIGH (ref 12–78)
ANION GAP SERPL CALC-SCNC: 8 MMOL/L — SIGNIFICANT CHANGE UP (ref 5–17)
APTT BLD: 78.1 SEC — HIGH (ref 27.5–35.5)
APTT BLD: 86.4 SEC — HIGH (ref 27.5–35.5)
AST SERPL-CCNC: 52 U/L — HIGH (ref 15–37)
BASOPHILS # BLD AUTO: 0.03 K/UL — SIGNIFICANT CHANGE UP (ref 0–0.2)
BASOPHILS NFR BLD AUTO: 0.3 % — SIGNIFICANT CHANGE UP (ref 0–2)
BILIRUB SERPL-MCNC: 0.9 MG/DL — SIGNIFICANT CHANGE UP (ref 0.2–1.2)
BUN SERPL-MCNC: 15 MG/DL — SIGNIFICANT CHANGE UP (ref 7–23)
CALCIUM SERPL-MCNC: 9 MG/DL — SIGNIFICANT CHANGE UP (ref 8.5–10.1)
CHLORIDE SERPL-SCNC: 103 MMOL/L — SIGNIFICANT CHANGE UP (ref 96–108)
CO2 SERPL-SCNC: 26 MMOL/L — SIGNIFICANT CHANGE UP (ref 22–31)
CREAT SERPL-MCNC: 0.84 MG/DL — SIGNIFICANT CHANGE UP (ref 0.5–1.3)
EOSINOPHIL # BLD AUTO: 0.11 K/UL — SIGNIFICANT CHANGE UP (ref 0–0.5)
EOSINOPHIL NFR BLD AUTO: 1.1 % — SIGNIFICANT CHANGE UP (ref 0–6)
GLUCOSE SERPL-MCNC: 109 MG/DL — HIGH (ref 70–99)
HCT VFR BLD CALC: 36.3 % — LOW (ref 39–50)
HCT VFR BLD CALC: 39.6 % — SIGNIFICANT CHANGE UP (ref 39–50)
HCV AB S/CO SERPL IA: 1.31 S/CO — HIGH (ref 0–0.99)
HCV AB SERPL-IMP: ABNORMAL
HGB BLD-MCNC: 12.5 G/DL — LOW (ref 13–17)
HGB BLD-MCNC: 13.3 G/DL — SIGNIFICANT CHANGE UP (ref 13–17)
IMM GRANULOCYTES NFR BLD AUTO: 0.4 % — SIGNIFICANT CHANGE UP (ref 0–1.5)
LYMPHOCYTES # BLD AUTO: 1.24 K/UL — SIGNIFICANT CHANGE UP (ref 1–3.3)
LYMPHOCYTES # BLD AUTO: 12.8 % — LOW (ref 13–44)
MAGNESIUM SERPL-MCNC: 2.2 MG/DL — SIGNIFICANT CHANGE UP (ref 1.6–2.6)
MCHC RBC-ENTMCNC: 31 PG — SIGNIFICANT CHANGE UP (ref 27–34)
MCHC RBC-ENTMCNC: 31.6 PG — SIGNIFICANT CHANGE UP (ref 27–34)
MCHC RBC-ENTMCNC: 33.6 GM/DL — SIGNIFICANT CHANGE UP (ref 32–36)
MCHC RBC-ENTMCNC: 34.4 GM/DL — SIGNIFICANT CHANGE UP (ref 32–36)
MCV RBC AUTO: 91.9 FL — SIGNIFICANT CHANGE UP (ref 80–100)
MCV RBC AUTO: 92.3 FL — SIGNIFICANT CHANGE UP (ref 80–100)
MONOCYTES # BLD AUTO: 0.91 K/UL — HIGH (ref 0–0.9)
MONOCYTES NFR BLD AUTO: 9.4 % — SIGNIFICANT CHANGE UP (ref 2–14)
NEUTROPHILS # BLD AUTO: 7.32 K/UL — SIGNIFICANT CHANGE UP (ref 1.8–7.4)
NEUTROPHILS NFR BLD AUTO: 76 % — SIGNIFICANT CHANGE UP (ref 43–77)
PHOSPHATE SERPL-MCNC: 3.5 MG/DL — SIGNIFICANT CHANGE UP (ref 2.5–4.5)
PLATELET # BLD AUTO: 229 K/UL — SIGNIFICANT CHANGE UP (ref 150–400)
PLATELET # BLD AUTO: 243 K/UL — SIGNIFICANT CHANGE UP (ref 150–400)
POTASSIUM SERPL-MCNC: 3.5 MMOL/L — SIGNIFICANT CHANGE UP (ref 3.5–5.3)
POTASSIUM SERPL-SCNC: 3.5 MMOL/L — SIGNIFICANT CHANGE UP (ref 3.5–5.3)
PROT SERPL-MCNC: 7.2 GM/DL — SIGNIFICANT CHANGE UP (ref 6–8.3)
RBC # BLD: 3.95 M/UL — LOW (ref 4.2–5.8)
RBC # BLD: 4.29 M/UL — SIGNIFICANT CHANGE UP (ref 4.2–5.8)
RBC # FLD: 13.4 % — SIGNIFICANT CHANGE UP (ref 10.3–14.5)
RBC # FLD: 13.4 % — SIGNIFICANT CHANGE UP (ref 10.3–14.5)
SARS-COV-2 RNA SPEC QL NAA+PROBE: SIGNIFICANT CHANGE UP
SODIUM SERPL-SCNC: 137 MMOL/L — SIGNIFICANT CHANGE UP (ref 135–145)
TROPONIN I SERPL-MCNC: <0.015 NG/ML — SIGNIFICANT CHANGE UP (ref 0.01–0.04)
TROPONIN I SERPL-MCNC: <0.015 NG/ML — SIGNIFICANT CHANGE UP (ref 0.01–0.04)
WBC # BLD: 10.43 K/UL — SIGNIFICANT CHANGE UP (ref 3.8–10.5)
WBC # BLD: 9.65 K/UL — SIGNIFICANT CHANGE UP (ref 3.8–10.5)
WBC # FLD AUTO: 10.43 K/UL — SIGNIFICANT CHANGE UP (ref 3.8–10.5)
WBC # FLD AUTO: 9.65 K/UL — SIGNIFICANT CHANGE UP (ref 3.8–10.5)

## 2021-02-18 PROCEDURE — 99223 1ST HOSP IP/OBS HIGH 75: CPT

## 2021-02-18 PROCEDURE — 93306 TTE W/DOPPLER COMPLETE: CPT | Mod: 26

## 2021-02-18 PROCEDURE — 99233 SBSQ HOSP IP/OBS HIGH 50: CPT

## 2021-02-18 RX ORDER — RIVAROXABAN 15 MG-20MG
15 KIT ORAL
Refills: 0 | Status: DISCONTINUED | OUTPATIENT
Start: 2021-02-18 | End: 2021-02-19

## 2021-02-18 RX ORDER — RIVAROXABAN 15 MG-20MG
1 KIT ORAL
Qty: 30 | Refills: 0
Start: 2021-02-18 | End: 2021-03-19

## 2021-02-18 RX ORDER — RIVAROXABAN 15 MG-20MG
1 KIT ORAL
Qty: 21 | Refills: 0
Start: 2021-02-18 | End: 2021-03-10

## 2021-02-18 RX ADMIN — ATORVASTATIN CALCIUM 10 MILLIGRAM(S): 80 TABLET, FILM COATED ORAL at 21:38

## 2021-02-18 RX ADMIN — HEPARIN SODIUM 1800 UNIT(S)/HR: 5000 INJECTION INTRAVENOUS; SUBCUTANEOUS at 10:53

## 2021-02-18 RX ADMIN — Medication 650 MILLIGRAM(S): at 21:38

## 2021-02-18 RX ADMIN — Medication 650 MILLIGRAM(S): at 05:49

## 2021-02-18 RX ADMIN — HEPARIN SODIUM 1800 UNIT(S)/HR: 5000 INJECTION INTRAVENOUS; SUBCUTANEOUS at 03:46

## 2021-02-18 RX ADMIN — Medication 1 TABLET(S): at 10:54

## 2021-02-18 RX ADMIN — FAMOTIDINE 10 MILLIGRAM(S): 10 INJECTION INTRAVENOUS at 10:55

## 2021-02-18 RX ADMIN — RIVAROXABAN 15 MILLIGRAM(S): KIT at 18:28

## 2021-02-18 RX ADMIN — TAMSULOSIN HYDROCHLORIDE 0.8 MILLIGRAM(S): 0.4 CAPSULE ORAL at 21:38

## 2021-02-18 RX ADMIN — ATORVASTATIN CALCIUM 10 MILLIGRAM(S): 80 TABLET, FILM COATED ORAL at 00:30

## 2021-02-18 RX ADMIN — TAMSULOSIN HYDROCHLORIDE 0.8 MILLIGRAM(S): 0.4 CAPSULE ORAL at 00:30

## 2021-02-18 NOTE — CONSULT NOTE ADULT - SUBJECTIVE AND OBJECTIVE BOX
CHIEF COMPLAINT: Patient is a 60y old  Male who presents with a chief complaint of PE, DVT, CP (17 Feb 2021 22:07)    HPI:  60 year old man with a history of hypercholesterolemia, GERD, and recent (2/1/21) right ankle fracture (treated with immobilization boot) who developed the abrupt onset of pleuritic chest pain on Sunday, 2/14/21.  He describes moderate, sharp, right chest pain with deep inspiration accompanied by mild dyspnea; felt better with shallow breathing.  He was referred to the ER by his primary care physician.  In the ER he was afebrile, normotensive, HR<100, tachypneic (RR 22), and with decreased SPO2 (92%).  He was diagnosed with DVT and PE; evidence of R heart strain on CT chest.  He feels "a little" better since admission -- no new complaints.  There is no past history of heart disease or thrombotic events.    PAST MEDICAL & SURGICAL HISTORY:  Nevus left upper back  Dyslipidemia  Peyronie&#x27;s disease  Arthritis left knee  GERD (gastroesophageal reflux disease)  Esophageal stricture  H/O tooth extraction  History of tonsillectomy    SOCIAL HISTORY:   Alcohol: Denied  Smoking: Nonsmoker    FAMILY HISTORY:   Family history of multiple myeloma in father    Home Medications:  alfuzosin 10 mg oral tablet, extended release: 1 tab(s) orally once a day (17 Feb 2021 19:17)  atorvastatin 10 mg oral tablet: 1 tab(s) orally once a day (17 Feb 2021 18:48)  famotidine 10 mg oral tablet: 1 tab(s) orally once a day (17 Feb 2021 19:17)  Multiple Vitamins oral tablet: 1 tab(s) orally once a day (17 Feb 2021 19:17)    MEDICATIONS  (STANDING):  atorvastatin 10 milliGRAM(s) Oral at bedtime  famotidine    Tablet 10 milliGRAM(s) Oral daily  heparin  Infusion.  Unit(s)/Hr (18 mL/Hr) IV Continuous <Continuous>  multivitamin 1 Tablet(s) Oral daily  tamsulosin 0.8 milliGRAM(s) Oral at bedtime    MEDICATIONS  (PRN):  acetaminophen   Tablet .. 650 milliGRAM(s) Oral every 4 hours PRN Temp greater or equal to 38C (100.4F), Mild Pain (1 - 3)  heparin   Injectable 8000 Unit(s) IV Push every 6 hours PRN For aPTT less than 40  heparin   Injectable 4000 Unit(s) IV Push every 6 hours PRN For aPTT between 40 - 57    Allergies:  No Known Allergies  Intolerances:  Levaquin (Vomiting)    REVIEW OF SYSTEMS:  CONSTITUTIONAL: No weakness, fevers or chills  Eyes: No visual changes  NECK: No pain or stiffness  RESPIRATORY: No hemoptysis; + shortness of breath  CARDIOVASCULAR:  + chest pain  GASTROINTESTINAL: No nausea, vomiting, or hematemesis  GENITOURINARY: No dysuria, frequency or hematuria  NEUROLOGICAL: No numbness.  SKIN: No itching or rash  All other review of systems is negative unless indicated above    Vital Signs Last 24 Hrs  T(C): 36.9 (18 Feb 2021 03:28), Max: 36.9 (18 Feb 2021 03:28)  T(F): 98.4 (18 Feb 2021 03:28), Max: 98.4 (18 Feb 2021 03:28)  HR: 89 (18 Feb 2021 06:00) (85 - 97)  BP: 118/62 (18 Feb 2021 06:00) (118/62 - 140/74)  BP(mean): 76 (18 Feb 2021 06:00) (73 - 94)  RR: 24 (18 Feb 2021 06:00) (18 - 24)  SpO2: 92% (18 Feb 2021 06:00) (92% - 95%)    PHYSICAL EXAM:  Constitutional: NAD, awake and alert  HEENT:  EOMI  Pulmonary: Non-labored, breath sounds are clear bilaterally, No wheezing, rales or rhonchi  Cardiovascular: S1 and S2, regular rate and rhythm, no Murmur  Gastrointestinal: Bowel Sounds present, soft, nontender.   Lymph: No peripheral edema. No cervical lymphadenopathy.  Neurological: Alert, no focal deficits  Skin: No rashes.  Psych:  Mood & affect appropriate  MSK: R leg in immobilizing device    LABS:                      13.3   9.65  )-----------( 243      ( 18 Feb 2021 05:58 )             39.6                   137    |  103    |  15     ----------------------------<  109    3.5     |  26     |  0.84     Ca    9.0        18 Feb 2021 05:58  Phos  3.5       18 Feb 2021 05:58  Mg     2.2       18 Feb 2021 05:58    TPro  7.2    /  Alb  3.1    /  TBili  0.9    /  DBili  x      /  AST  52     /  ALT  116    /  AlkPhos  112    18 Feb 2021 05:58    PT/INR - ( 17 Feb 2021 16:20 )   PT: 15.3 sec;   INR: 1.33 ratio    PTT - ( 18 Feb 2021 02:17 )  PTT:86.4 sec    CARDIAC MARKERS ( 18 Feb 2021 02:17 ) <0.015 ng/mL / x     / x     / x     / x      CARDIAC MARKERS ( 17 Feb 2021 23:15 ) <0.015 ng/mL / x     / x     / x     / x      CARDIAC MARKERS ( 17 Feb 2021 16:20 ) <0.015 ng/mL / x     / x     / x     / x        Pro Bnp 35    Tele:  Sinus rhythm    CT Angio Chest PE Protocol w/ IV Cont (02.17.21 @ 18:24):  Extensive acute right pulmonary emboli as described. Possible right ventricular strain  Extensive bibasilar atelectasis. Difficult to exclude concomitant infection or infarction.  Small bilateral pleural effusions    US Duplex Venous Lower Ext Complete, Bilateral (02.17.21 @ 17:36): Extensive right lower extremity DVT as described above. Thrombus within the right mid and distal femoral vein is mobile. No left lower extremity DVT.    Xray Chest 2 Views PA/Lat (08.03.17 @ 08:40):  The lungs are clear. There are no pleural effusions or pneumothorax.  The heart size is normal.  No acute bony pathology.  
  HPI:  Pt is a 61 yo male with a pmh/o HLD, esophageal stricture, GERD, OA, Peyronie's disease, shingles, recent right ankle fracture s/p fall on 2/1/21, who is currently in boot and with prolonged immobilization since incident, who was referred to ED by covering physician at PMD office due to concern for underlying DVT in setting of worsening right ankle/leg swelling over past three days. Pt also c/o right sided flank radiating to right sided chest pain that is 5/10, intermittent, aggravated by deep breaths, alleviated by rest and shallow breathing, no prior episodes, no cough, associated with exertional shortness of breath as well. Pt denies fevers, cough, rash, n/v/d, sick contacts, smoking, exogenous/OTC herbal supplements or hormone use, familial history of clotting/bleeding disorders, dysuria, diaphoresis, abd pain, hematuria.  (17 Feb 2021 22:07)    History as above. CTA shows multiple right sided pulmonary emboli w infarct. Has extensive DVT RLE.      PAST MEDICAL & SURGICAL HISTORY:  Nevus  left upper back    Dyslipidemia    Peyronie&#x27;s disease    Arthritis  left knee    GERD (gastroesophageal reflux disease)    Esophageal stricture    H/O tooth extraction    History of tonsillectomy        MEDICATIONS  (STANDING):  atorvastatin 10 milliGRAM(s) Oral at bedtime  famotidine    Tablet 10 milliGRAM(s) Oral daily  heparin  Infusion.  Unit(s)/Hr (18 mL/Hr) IV Continuous <Continuous>  multivitamin 1 Tablet(s) Oral daily  tamsulosin 0.8 milliGRAM(s) Oral at bedtime    MEDICATIONS  (PRN):  acetaminophen   Tablet .. 650 milliGRAM(s) Oral every 4 hours PRN Temp greater or equal to 38C (100.4F), Mild Pain (1 - 3)  heparin   Injectable 8000 Unit(s) IV Push every 6 hours PRN For aPTT less than 40  heparin   Injectable 4000 Unit(s) IV Push every 6 hours PRN For aPTT between 40 - 57      Allergies    No Known Allergies    Intolerances    Levaquin (Vomiting)      SOCIAL HISTORY: Denies tobacco, etoh abuse or illicit drug use    FAMILY HISTORY:  Family history of multiple myeloma  in father        Vital Signs Last 24 Hrs  T(C): 36.9 (18 Feb 2021 03:28), Max: 36.9 (18 Feb 2021 03:28)  T(F): 98.4 (18 Feb 2021 03:28), Max: 98.4 (18 Feb 2021 03:28)  HR: 89 (18 Feb 2021 06:00) (85 - 97)  BP: 118/62 (18 Feb 2021 06:00) (118/62 - 140/74)  BP(mean): 76 (18 Feb 2021 06:00) (73 - 94)  RR: 24 (18 Feb 2021 06:00) (18 - 24)  SpO2: 92% (18 Feb 2021 06:00) (92% - 95%)    REVIEW OF SYSTEMS:    CONSTITUTIONAL:  As per HPI.  SKIN: no rashes  HEENT:  Eyes:  No diplopia or blurred vision. ENT:  No earache, sore throat or runny nose.  CARDIOVASCULAR:  No pressure, squeezing, tightness, heaviness or aching about the chest, neck, axilla or epigastrium.  RESPIRATORY:  pleuritic pain  GASTROINTESTINAL:  No nausea, vomiting or diarrhea.  GENITOURINARY:  No dysuria, frequency or urgency.  MUSCULOSKELETAL:  As per HPI.  SKIN:  No change in skin, hair or nails.  NEUROLOGIC:  No paresthesias, fasciculations, seizures or weakness.  PSYCHIATRIC:  No disorder of thought or mood.  ENDOCRINE:  No heat or cold intolerance, polyuria or polydipsia.  HEMATOLOGICAL:  No easy bruising or bleedings:  .     PHYSICAL EXAMINATION:    GENERAL APPEARANCE:  Pt. is not currently dyspneic, in no distress. Pt. is alert, oriented, and pleasant.  HEENT:  Pupils are normal and react normally. No icterus. Mucous membranes well colored.  NECK:  Supple. No lymphadenopathy. Jugular venous pressure not elevated. Carotids equal.   HEART:   The cardiac impulse has a normal quality. Regular. Normal S1 and S2. There are no murmurs, rubs or gallops noted  CHEST:  right  basilar crackles  ABDOMEN:  Soft and nontender.   EXTREMITIES:  There is no cyanosis, clubbing or edema.   SKIN:  No rash or significant lesions are noted.    LABS:                        13.3   9.65  )-----------( 243      ( 18 Feb 2021 05:58 )             39.6     02-18    137  |  103  |  15  ----------------------------<  109<H>  3.5   |  26  |  0.84    Ca    9.0      18 Feb 2021 05:58  Phos  3.5     02-18  Mg     2.2     02-18    TPro  7.2  /  Alb  3.1<L>  /  TBili  0.9  /  DBili  x   /  AST  52<H>  /  ALT  116<H>  /  AlkPhos  112  02-18    LIVER FUNCTIONS - ( 18 Feb 2021 05:58 )  Alb: 3.1 g/dL / Pro: 7.2 gm/dL / ALK PHOS: 112 U/L / ALT: 116 U/L / AST: 52 U/L / GGT: x           PT/INR - ( 17 Feb 2021 16:20 )   PT: 15.3 sec;   INR: 1.33 ratio         PTT - ( 18 Feb 2021 09:45 )  PTT:78.1 sec  CARDIAC MARKERS ( 18 Feb 2021 02:17 )  <0.015 ng/mL / x     / x     / x     / x      CARDIAC MARKERS ( 17 Feb 2021 23:15 )  <0.015 ng/mL / x     / x     / x     / x      CARDIAC MARKERS ( 17 Feb 2021 16:20 )  <0.015 ng/mL / x     / x     / x     / x                RADIOLOGY & ADDITIONAL STUDIES:

## 2021-02-18 NOTE — CONSULT NOTE ADULT - ASSESSMENT
- patient with provoked pulmonary embolism/dvt  - on heparin  - start NOAC  - echo pending-  - can f/u as outpatient

## 2021-02-18 NOTE — CONSULT NOTE ADULT - PROBLEM SELECTOR RECOMMENDATION 2
Extensive acute right pulmonary emboli + suspicion for right ventricular strain on CT but no hemodynamic compromise and no tachycardia; continue unfractionsed heparin with transition to oral anticoagulant; echo today to assess R heart and PASP.

## 2021-02-18 NOTE — CONSULT NOTE ADULT - PROBLEM SELECTOR PROBLEM 3
DVT (deep venous thrombosis)
Acute deep vein thrombosis (DVT) of femoral vein of right lower extremity

## 2021-02-18 NOTE — CONSULT NOTE ADULT - PROBLEM SELECTOR RECOMMENDATION 3
Extensive right lower extremity DVT in setting of ankle fracture treated w/ immobilization.  Anticoagulation.

## 2021-02-19 ENCOUNTER — TRANSCRIPTION ENCOUNTER (OUTPATIENT)
Age: 61
End: 2021-02-19

## 2021-02-19 VITALS
RESPIRATION RATE: 21 BRPM | DIASTOLIC BLOOD PRESSURE: 62 MMHG | HEART RATE: 79 BPM | SYSTOLIC BLOOD PRESSURE: 115 MMHG | OXYGEN SATURATION: 94 %

## 2021-02-19 LAB
APTT BLD: 35.7 SEC — HIGH (ref 27.5–35.5)
HCT VFR BLD CALC: 38.4 % — LOW (ref 39–50)
HGB BLD-MCNC: 12.8 G/DL — LOW (ref 13–17)
MCHC RBC-ENTMCNC: 31.1 PG — SIGNIFICANT CHANGE UP (ref 27–34)
MCHC RBC-ENTMCNC: 33.3 GM/DL — SIGNIFICANT CHANGE UP (ref 32–36)
MCV RBC AUTO: 93.2 FL — SIGNIFICANT CHANGE UP (ref 80–100)
PLATELET # BLD AUTO: 270 K/UL — SIGNIFICANT CHANGE UP (ref 150–400)
RBC # BLD: 4.12 M/UL — LOW (ref 4.2–5.8)
RBC # FLD: 13.2 % — SIGNIFICANT CHANGE UP (ref 10.3–14.5)
SARS-COV-2 IGG SERPL QL IA: NEGATIVE — SIGNIFICANT CHANGE UP
SARS-COV-2 IGM SERPL IA-ACNC: <0.1 INDEX — SIGNIFICANT CHANGE UP
WBC # BLD: 8.5 K/UL — SIGNIFICANT CHANGE UP (ref 3.8–10.5)
WBC # FLD AUTO: 8.5 K/UL — SIGNIFICANT CHANGE UP (ref 3.8–10.5)

## 2021-02-19 PROCEDURE — 99233 SBSQ HOSP IP/OBS HIGH 50: CPT

## 2021-02-19 PROCEDURE — 99239 HOSP IP/OBS DSCHRG MGMT >30: CPT

## 2021-02-19 RX ADMIN — FAMOTIDINE 10 MILLIGRAM(S): 10 INJECTION INTRAVENOUS at 10:05

## 2021-02-19 RX ADMIN — RIVAROXABAN 15 MILLIGRAM(S): KIT at 10:05

## 2021-02-19 RX ADMIN — Medication 1 TABLET(S): at 10:05

## 2021-02-19 NOTE — DISCHARGE NOTE PROVIDER - HOSPITAL COURSE
61 yo male with a pmh/o recent right ankle fracture s/p fall on 2/1/21, who is currently in boot and with prolonged immobilization since incident, who was refereed to ED by covering physician at PMD office due to concern for underlying DVT in setting of worsening right ankle/leg swelling associated with chest pain and dyspnea, over past three days, admitted due to:      #Acute PE/DVT  Xarelto    #Transaminitis  fatty infiltration noted on imaging  on statin  mild will f/up with PCP      #Right ankle fracture  OOB with assistance  cont to use crutches  fall/fx precautions    #HLD  DASH/TLC diet  cont statin-monitor LFTs    #GERD  cont pepcid    #Peyroinies/BPH  cont flomax    weakly Hep C + will f/up with pcp

## 2021-02-19 NOTE — PROGRESS NOTE ADULT - PROBLEM SELECTOR PROBLEM 1
Pleuritic chest pain
Multiple subsegmental pulmonary emboli without acute cor pulmonale
Pulmonary embolism

## 2021-02-19 NOTE — PROGRESS NOTE ADULT - PROBLEM SELECTOR PROBLEM 3
DVT (deep venous thrombosis)
Acute deep vein thrombosis (DVT) of femoral vein of right lower extremity
DVT (deep venous thrombosis)

## 2021-02-19 NOTE — PROGRESS NOTE ADULT - ASSESSMENT
- patient with provoked pulmonary embolism/dvt  - on xarelto  - echo pending-  - can f/u as outpatient

## 2021-02-19 NOTE — DISCHARGE NOTE NURSING/CASE MANAGEMENT/SOCIAL WORK - PATIENT PORTAL LINK FT
You can access the FollowMyHealth Patient Portal offered by [facility] by registering at the following website: [email or web address]. By joining [facility]’s FollowMyHealth portal, you will also be able to view your health information using other applications (apps) compatible with our system.

## 2021-02-19 NOTE — PROGRESS NOTE ADULT - SUBJECTIVE AND OBJECTIVE BOX
60 year old man with a history of hypercholesterolemia, GERD, and recent (2/1/21) right ankle fracture (treated with immobilization boot) who developed the abrupt onset of pleuritic chest pain on Sunday, 2/14/21.  He describes moderate, sharp, right chest pain with deep inspiration accompanied by mild dyspnea; felt better with shallow breathing.  He was referred to the ER by his primary care physician.  In the ER he was afebrile, normotensive, HR<100, tachypneic (RR 22), and with decreased SPO2 (92%).  He was diagnosed with DVT and PE; evidence of R heart strain on CT chest.  He feels "a little" better since admission -- no new complaints.  There is no past history of heart disease or thrombotic events.      Vital Signs Last 24 Hrs  T(C): 36.9 (18 Feb 2021 03:28), Max: 36.9 (18 Feb 2021 03:28)  T(F): 98.4 (18 Feb 2021 03:28), Max: 98.4 (18 Feb 2021 03:28)  HR: 89 (18 Feb 2021 06:00) (85 - 97)  BP: 118/62 (18 Feb 2021 06:00) (118/62 - 140/74)  BP(mean): 76 (18 Feb 2021 06:00) (73 - 94)  RR: 24 (18 Feb 2021 06:00) (18 - 24)  SpO2: 92% (18 Feb 2021 06:00) (92% - 95%)    PHYSICAL EXAM:  Constitutional: NAD, awake and alert  HEENT:  EOMI  Pulmonary: Non-labored, breath sounds are clear bilaterally, No wheezing, rales or rhonchi  Cardiovascular: S1 and S2, regular rate and rhythm, no Murmur  Gastrointestinal: Bowel Sounds present, soft, nontender.   Lymph: No peripheral edema. No cervical lymphadenopathy.  Neurological: Alert, no focal deficits  Skin: No rashes.  Psych:  Mood & affect appropriate  MSK: R leg in immobilizing device    LABS:                      13.3   9.65  )-----------( 243      ( 18 Feb 2021 05:58 )             39.6                   137    |  103    |  15     ----------------------------<  109    3.5     |  26     |  0.84     Ca    9.0        18 Feb 2021 05:58  Phos  3.5       18 Feb 2021 05:58  Mg     2.2       18 Feb 2021 05:58    TPro  7.2    /  Alb  3.1    /  TBili  0.9    /  DBili  x      /  AST  52     /  ALT  116    /  AlkPhos  112    18 Feb 2021 05:58    PT/INR - ( 17 Feb 2021 16:20 )   PT: 15.3 sec;   INR: 1.33 ratio    PTT - ( 18 Feb 2021 02:17 )  PTT:86.4 sec    CARDIAC MARKERS ( 18 Feb 2021 02:17 ) <0.015 ng/mL / x     / x     / x     / x      CARDIAC MARKERS ( 17 Feb 2021 23:15 ) <0.015 ng/mL / x     / x     / x     / x      CARDIAC MARKERS ( 17 Feb 2021 16:20 ) <0.015 ng/mL / x     / x     / x     / x        Pro Bnp 35    Tele:  Sinus rhythm    CT Angio Chest PE Protocol w/ IV Cont (02.17.21 @ 18:24):  Extensive acute right pulmonary emboli as described. Possible right ventricular strain  Extensive bibasilar atelectasis. Difficult to exclude concomitant infection or infarction.  Small bilateral pleural effusions    US Duplex Venous Lower Ext Complete, Bilateral (02.17.21 @ 17:36): Extensive right lower extremity DVT as described above. Thrombus within the right mid and distal femoral vein is mobile. No left lower extremity DVT.    Xray Chest 2 Views PA/Lat (08.03.17 @ 08:40):  The lungs are clear. There are no pleural effusions or pneumothorax.  The heart size is normal.  No acute bony pathology.      61 yo male with a pmh/o recent right ankle fracture s/p fall on 2/1/21, who is currently in boot and with prolonged immobilization since incident, who was refereed to ED by covering physician at PMD office due to concern for underlying DVT in setting of worsening right ankle/leg swelling associated with chest pain and dyspnea, over past three days, admitted due to:    #Atypical chest pain  admit to telemetry given possible RH strain on imaging and c/o chest pain  CP pleuritic, mostly attributed to multiple PE however concerning given finding on imaging  trend troponins, first set wnl  BnP wnl  EKG w/o STT elevation  f/u TTE  Cardiology consulted  cont statin    #Acute PE/DVT  change to Xarelto    #Transaminitis  fatty infiltration noted on imaging  on statin      #Right ankle fracture  OOB with assistance  cont to use crutches  fall/fx precautions    #HLD  DASH/TLC diet  cont statin-monitor LFTs    #GERD  cont pepcid    #Peyroinies/BPH  cont flomax      
Subjective:  no distress  mild hemoptysis    MEDICATIONS  (STANDING):  atorvastatin 10 milliGRAM(s) Oral at bedtime  famotidine    Tablet 10 milliGRAM(s) Oral daily  multivitamin 1 Tablet(s) Oral daily  rivaroxaban 15 milliGRAM(s) Oral two times a day with meals  tamsulosin 0.8 milliGRAM(s) Oral at bedtime    MEDICATIONS  (PRN):  acetaminophen   Tablet .. 650 milliGRAM(s) Oral every 4 hours PRN Temp greater or equal to 38C (100.4F), Mild Pain (1 - 3)      Allergies    No Known Allergies    Intolerances    Levaquin (Vomiting)      REVIEW OF SYSTEMS:    CONSTITUTIONAL:  As per HPI.  HEENT:  Eyes:  No diplopia or blurred vision. ENT:  No earache, sore throat or runny nose.  CARDIOVASCULAR:  No pressure, squeezing, tightness, heaviness or aching about the chest, neck, axilla or epigastrium.  RESPIRATORY:  No cough, shortness of breath, PND or orthopnea.  GASTROINTESTINAL:  No nausea, vomiting or diarrhea.  GENITOURINARY:  No dysuria, frequency or urgency.  MUSCULOSKELETAL:  no joint pain, deformity, tenderness  EXTREMITIES: no clubbing cyanosis,edema  SKIN:  No change in skin, hair or nails.  NEUROLOGIC:  No paresthesias, fasciculations, seizures or weakness.  PSYCHIATRIC:  No disorder of thought or mood.  ENDOCRINE:  No heat or cold intolerance, polyuria or polydipsia.  HEMATOLOGICAL:  No easy bruising or bleedings:    Vital Signs Last 24 Hrs  T(C): 36.6 (19 Feb 2021 08:25), Max: 37.6 (18 Feb 2021 12:11)  T(F): 97.8 (19 Feb 2021 08:25), Max: 99.7 (18 Feb 2021 12:11)  HR: 75 (19 Feb 2021 06:00) (74 - 92)  BP: 103/53 (19 Feb 2021 06:00) (93/78 - 121/66)  BP(mean): 65 (19 Feb 2021 06:00) (65 - 81)  RR: 19 (19 Feb 2021 06:00) (17 - 24)  SpO2: 91% (19 Feb 2021 06:00) (91% - 96%)    PHYSICAL EXAMINATION:  SKIN: no rashes  HEAD: NC/AT  EYES: PERRLA, EOMI  EARS: TM's intact  NOSE: no abnormalities  NECK:  Supple. No lymphadenopathy. Jugular venous pressure not elevated. Carotids equal.   HEART:   The cardiac impulse has a normal quality. Reg., Nl S1 and S2.  There are no murmurs, rubs or gallops noted  CHEST:  Chest is clear to auscultation. Normal respiratory effort.  ABDOMEN:  Soft and nontender.   EXTREMITIES:  no C/C/E  NEURO: AAO x 3, no focal deficts       LABS:                        12.8   8.50  )-----------( 270      ( 19 Feb 2021 06:30 )             38.4     02-18    137  |  103  |  15  ----------------------------<  109<H>  3.5   |  26  |  0.84    Ca    9.0      18 Feb 2021 05:58  Phos  3.5     02-18  Mg     2.2     02-18    TPro  7.2  /  Alb  3.1<L>  /  TBili  0.9  /  DBili  x   /  AST  52<H>  /  ALT  116<H>  /  AlkPhos  112  02-18    PT/INR - ( 17 Feb 2021 16:20 )   PT: 15.3 sec;   INR: 1.33 ratio         PTT - ( 19 Feb 2021 06:30 )  PTT:35.7 sec      RADIOLOGY & ADDITIONAL TESTS:    
  REASON FOR VISIT: PE, DVT    HPI:  60 year old man with a history of hypercholesterolemia, GERD, and recent (2/1/21) right ankle fracture (treated with immobilization boot) who developed the abrupt onset of pleuritic chest pain on Sunday, 2/14/21 and was admitted on 2/17 with acute DVT and PE.    2/19/21:  Cough with scant blood-tinged sputum; comfortable; less pleuritic CP    MEDICATIONS  (STANDING):  atorvastatin 10 milliGRAM(s) Oral at bedtime  famotidine    Tablet 10 milliGRAM(s) Oral daily  multivitamin 1 Tablet(s) Oral daily  rivaroxaban 15 milliGRAM(s) Oral two times a day with meals  tamsulosin 0.8 milliGRAM(s) Oral at bedtime    MEDICATIONS  (PRN):  acetaminophen   Tablet .. 650 milliGRAM(s) Oral every 4 hours PRN Temp greater or equal to 38C (100.4F), Mild Pain (1 - 3)    Vital Signs Last 24 Hrs  T(C): 36.6 (19 Feb 2021 08:25), Max: 37.6 (18 Feb 2021 12:11)  T(F): 97.8 (19 Feb 2021 08:25), Max: 99.7 (18 Feb 2021 12:11)  HR: 75 (19 Feb 2021 06:00) (74 - 92)  BP: 103/53 (19 Feb 2021 06:00) (93/78 - 121/66)  BP(mean): 65 (19 Feb 2021 06:00) (65 - 81)  RR: 19 (19 Feb 2021 06:00) (17 - 24)  SpO2: 91% (19 Feb 2021 06:00) (91% - 96%)    PHYSICAL EXAM:  Constitutional: NAD, awake and alert, eating breakfast in bed  Pulmonary: Non-labored, coughing  Cardiovascular: S1 and S2, regular rate and rhythm  Psych:  Mood & affect appropriate  MSK: R leg in immobilizing device    LABS:           CARDIAC MARKERS ( 18 Feb 2021 02:17 ) <0.015 ng/mL / x     / x     / x     / x      CARDIAC MARKERS ( 17 Feb 2021 23:15 ) <0.015 ng/mL / x     / x     / x     / x      CARDIAC MARKERS ( 17 Feb 2021 16:20 ) <0.015 ng/mL / x     / x     / x     / x                          12.8   8.50  )-----------( 270      ( 19 Feb 2021 06:30 )             38.4     137  |  103  |  15  ----------------------------<  109<H>  3.5   |  26  |  0.84    Ca    9.0      18 Feb 2021 05:58  Phos  3.5     02-18  Mg     2.2     02-18    TPro  7.2  /  Alb  3.1<L>  /  TBili  0.9  /  DBili  x   /  AST  52<H>  /  ALT  116<H>  /  AlkPhos  112  02-18    Pro Bnp 35    Tele:  Sinus rhythm    CT Angio Chest PE Protocol w/ IV Cont (02.17.21 @ 18:24):  Extensive acute right pulmonary emboli as described. Possible right ventricular strain  Extensive bibasilar atelectasis. Difficult to exclude concomitant infection or infarction.  Small bilateral pleural effusions    US Duplex Venous Lower Ext Complete, Bilateral (02.17.21 @ 17:36): Extensive right lower extremity DVT as described above. Thrombus within the right mid and distal femoral vein is mobile. No left lower extremity DVT.    Xray Chest 2 Views PA/Lat (08.03.17 @ 08:40):  The lungs are clear. There are no pleural effusions or pneumothorax.  The heart size is normal.  No acute bony pathology.    Tele:  SR

## 2021-02-19 NOTE — DISCHARGE NOTE PROVIDER - NSDCMRMEDTOKEN_GEN_ALL_CORE_FT
alfuzosin 10 mg oral tablet, extended release: 1 tab(s) orally once a day  atorvastatin 10 mg oral tablet: 1 tab(s) orally once a day  famotidine 10 mg oral tablet: 1 tab(s) orally once a day  Multiple Vitamins oral tablet: 1 tab(s) orally once a day  Xarelto 15 mg oral tablet: 1 tab(s) orally once a day (in the evening)   Xarelto 20 mg oral tablet: 1 tab(s) orally once a day (in the evening) after the 21 days on 15 mg BID

## 2021-02-19 NOTE — DISCHARGE NOTE PROVIDER - CARE PROVIDER_API CALL
Boxer, Jonathan A  INTERNAL MEDICINE  [address] [place] [address]  Phone: [phone]  Fax: [phone]  Follow Up Time:

## 2021-02-19 NOTE — PROGRESS NOTE ADULT - PROBLEM SELECTOR PLAN 1
Stable -- no tachycardia or hemodynamic instability; small amounts of blood tinged sputum -- I discussed with Dr Mack, attributed to small pulmonary infarction.  Tolerating Xarelto; normal RV function.  Assess SPO2 without supplemental O2.

## 2021-02-19 NOTE — DISCHARGE NOTE PROVIDER - NSDCCPCAREPLAN_GEN_ALL_CORE_FT
PRINCIPAL DISCHARGE DIAGNOSIS  Diagnosis: Pulmonary embolism  Assessment and Plan of Treatment: take Xarelto as prescribed; f/up with PCP; liver enzymes are a little high; ask PCP if you need the statin. Take the Hep C test to PCP      SECONDARY DISCHARGE DIAGNOSES  Diagnosis: DVT (deep venous thrombosis)  Assessment and Plan of Treatment:

## 2021-02-24 ENCOUNTER — NON-APPOINTMENT (OUTPATIENT)
Age: 61
End: 2021-02-24

## 2021-02-24 ENCOUNTER — TRANSCRIPTION ENCOUNTER (OUTPATIENT)
Age: 61
End: 2021-02-24

## 2021-02-25 ENCOUNTER — NON-APPOINTMENT (OUTPATIENT)
Age: 61
End: 2021-02-25

## 2021-02-25 ENCOUNTER — APPOINTMENT (OUTPATIENT)
Dept: INTERNAL MEDICINE | Facility: CLINIC | Age: 61
End: 2021-02-25
Payer: COMMERCIAL

## 2021-02-25 ENCOUNTER — APPOINTMENT (OUTPATIENT)
Dept: ORTHOPEDIC SURGERY | Facility: CLINIC | Age: 61
End: 2021-02-25
Payer: COMMERCIAL

## 2021-02-25 VITALS
RESPIRATION RATE: 16 BRPM | HEIGHT: 74 IN | TEMPERATURE: 96.3 F | OXYGEN SATURATION: 97 % | WEIGHT: 208 LBS | SYSTOLIC BLOOD PRESSURE: 110 MMHG | HEART RATE: 96 BPM | DIASTOLIC BLOOD PRESSURE: 80 MMHG | BODY MASS INDEX: 26.69 KG/M2

## 2021-02-25 DIAGNOSIS — R07.81 PLEURODYNIA: ICD-10-CM

## 2021-02-25 PROCEDURE — ZZZZZ: CPT

## 2021-02-25 PROCEDURE — 94727 GAS DIL/WSHOT DETER LNG VOL: CPT

## 2021-02-25 PROCEDURE — 94010 BREATHING CAPACITY TEST: CPT

## 2021-02-25 PROCEDURE — 27786 TREATMENT OF ANKLE FRACTURE: CPT | Mod: RT

## 2021-02-25 PROCEDURE — 99205 OFFICE O/P NEW HI 60 MIN: CPT | Mod: 25

## 2021-02-25 PROCEDURE — 99072 ADDL SUPL MATRL&STAF TM PHE: CPT

## 2021-02-25 PROCEDURE — 94729 DIFFUSING CAPACITY: CPT

## 2021-02-25 PROCEDURE — 99204 OFFICE O/P NEW MOD 45 MIN: CPT | Mod: 57

## 2021-02-25 PROCEDURE — 73610 X-RAY EXAM OF ANKLE: CPT | Mod: RT

## 2021-02-25 NOTE — HISTORY OF PRESENT ILLNESS
[FreeTextEntry1] : 60 year old male presenting with right ankle pain. The patient’s pain is noted to be a 0/10. The patient's pain began 2/1/2021 when he missed a step and fell at home, injured the right ankle. Patient was evaluated by Dr. Medrano who took images and was diagnosed with an ankle fracture and was placed in a CAM boot which he has with him today. The patient went to [facility] on 2/17/2021 and had CT scans, was diagnosed with multiple PE and DVTs, and was discharged on 2/19/2021. The patient c/o slight lack of sensation on the bottom of his foot. The patient presents ambulating in crutches. He is currently taking Tylenol and utilizing ice. Patient is on Xarelto. No other complaints at this time.

## 2021-02-25 NOTE — PHYSICAL EXAM
[de-identified] : General: Alert and oriented x3. In no acute distress. Pleasant in nature with a normal affect. No apparent respiratory distress.\par \par R Ankle Exam\par Skin: Clean, dry, intact\par Inspection: No obvious malalignment, no swelling, no effusion; no lymphadenopathy\par Pulses: 2+ DP/PT pulses\par ROM: R Ankle 10 degrees of dorsiflexion, 40 degrees of plantarflexion, 10 degrees of subtalar motion\par Tenderness: +pain fibula +tenderness over the lateral malleolus, no CFL/ATFL/PTFL pain. No medial malleolus pain, no deltoid ligament pain. No proximal fibular pain. No heel pain.\par Stability: Negative anterior/posterior drawer.\par Strength: 5/5 TA/GS/EHL\par Neuro: In tact to light touch throughout\par Additional tests: Negative Mortons test, Negative syndesmosis squeeze test.  [de-identified] : 3V of the right ankle were ordered obtained and reviewed by me today, 02/25/2021 , revealed: Fibula fracture.

## 2021-02-25 NOTE — ADDENDUM
[FreeTextEntry1] : I, David Cedeño, acted solely as a scribe for Dr. Cordell Fernandes on this date 02/25/2021 .\par All medical record entries made by the Scribe were at my, Dr. Cordell Fernandes, direction and personally dictated by me on 02/25/2021 . I have reviewed the chart and agree that the record accurately reflects my personal performance of the history, physical exam, assessment and plan. I have also personally directed, reviewed, and agreed with the chart.

## 2021-02-25 NOTE — CONSULT LETTER
[Consult Letter:] : I had the pleasure of evaluating your patient, [unfilled]. [Please see my note below.] : Please see my note below. [Consult Closing:] : Thank you very much for allowing me to participate in the care of this patient.  If you have any questions, please do not hesitate to contact me. [Sincerely,] : Sincerely, [FreeTextEntry3] : Coredll Fernandes, DO\par Foot and Ankle Surgery\par

## 2021-02-25 NOTE — DISCUSSION/SUMMARY
[de-identified] : Today I had a lengthy discussion with the patient regarding their right ankle pain. I have addressed all the patient's concerns surrounding the pathology of their condition. I advised the patient to continue with the Xarelto. I recommend that the patient utilize 50,000 units of vitamin D once per week. A prescription was given in the office today. I recommend that the patient utilize ice, NSAIDS PRN, and heat. He can also elevate the right ankle above the level of the heart. I recommend the patient continue with the CAM boot and remain non weight bearing. I would like to see the patient back in the office in 2 weeks to reassess their condition, and obtain new x-rays. The patient understood and verbally agreed to the treatment plan. All of their questions were answered and they were satisfied with the visit. The patient should call the office if they have any questions or experience worsening symptoms.

## 2021-03-04 ENCOUNTER — APPOINTMENT (OUTPATIENT)
Dept: INTERNAL MEDICINE | Facility: CLINIC | Age: 61
End: 2021-03-04

## 2021-03-15 ENCOUNTER — APPOINTMENT (OUTPATIENT)
Dept: ORTHOPEDIC SURGERY | Facility: CLINIC | Age: 61
End: 2021-03-15
Payer: COMMERCIAL

## 2021-03-15 DIAGNOSIS — M25.571 PAIN IN RIGHT ANKLE AND JOINTS OF RIGHT FOOT: ICD-10-CM

## 2021-03-15 DIAGNOSIS — S99.911A UNSPECIFIED INJURY OF RIGHT ANKLE, INITIAL ENCOUNTER: ICD-10-CM

## 2021-03-15 DIAGNOSIS — S82.401A UNSPECIFIED FRACTURE OF SHAFT OF RIGHT FIBULA, INITIAL ENCOUNTER FOR CLOSED FRACTURE: ICD-10-CM

## 2021-03-15 PROCEDURE — 73610 X-RAY EXAM OF ANKLE: CPT | Mod: RT

## 2021-03-15 PROCEDURE — 99024 POSTOP FOLLOW-UP VISIT: CPT

## 2021-03-15 NOTE — HISTORY OF PRESENT ILLNESS
[FreeTextEntry1] : 60 year old male presenting with right ankle pain. The patient’s pain is noted to be a 1/10. The pain is noted to be 75% improved, and the swelling noted to be 50% improved compared to the previous visit. The patient does report some loss of sensation on the bottom of the foot. The patient presents wearing a CAM boot. The patient presents ambulating in crutches.  He is currently taking no pain medication. Patient is currently on Xarelto.  He is taking vitamin D weekly. No other complaints at this time.

## 2021-03-15 NOTE — ADDENDUM
[FreeTextEntry1] : I, David Cedeoñ, acted solely as a scribe for Dr. Cordell Fernandes on this date 03/15/2021 .\par All medical record entries made by the Scribe were at my, Dr. Cordell Fernandes, direction and personally dictated by me on 03/15/2021 . I have reviewed the chart and agree that the record accurately reflects my personal performance of the history, physical exam, assessment and plan. I have also personally directed, reviewed, and agreed with the chart.

## 2021-03-15 NOTE — DISCUSSION/SUMMARY
[de-identified] : Today I had a lengthy discussion with the patient regarding their right ankle pain. I have addressed all the patient's concerns surrounding the pathology of their condition. I recommend that the patient transition out of their CAM boot and into an ASO brace as tolerated. The patient was provided with the ASO brace in the office today. I recommend the patient undergo a course of physical therapy for the right ankle 2-3 times a week for a total of 6-8 weeks. A prescription was given for the physical therapy today. I advised the patient to continue with the weekly vitamin D.  I would like to see the patient back in the office in 6 weeks to reassess their condition. The patient understood and verbally agreed to the treatment plan. All of their questions were answered and they were satisfied with the visit. The patient should call the office if they have any questions or experience worsening symptoms.

## 2021-03-15 NOTE — PHYSICAL EXAM
[de-identified] : General: Alert and oriented x3. In no acute distress. Pleasant in nature with a normal affect. No apparent respiratory distress.\par \par R Ankle Exam\par Skin: Clean, dry, intact\par Inspection: No obvious malalignment, no swelling, no effusion; no lymphadenopathy\par Pulses: 2+ DP/PT pulses\par ROM: R Ankle 10 degrees of dorsiflexion, 40 degrees of plantarflexion, 10 degrees of subtalar motion\par Tenderness: Improved pain fibula Improved tenderness over the lateral malleolus, no CFL/ATFL/PTFL pain. No medial malleolus pain, no deltoid ligament pain. No proximal fibular pain. No heel pain.\par Stability: Negative anterior/posterior drawer.\par Strength: 5/5 TA/GS/EHL\par Neuro: Diminished sensation plantar foot\par Additional tests: Negative Mortons test, Negative syndesmosis squeeze test.  [de-identified] : 3V of the right ankle were ordered obtained and reviewed by me today, 03/15/2021 , revealed: Healing fibula fracture.

## 2021-04-16 ENCOUNTER — APPOINTMENT (OUTPATIENT)
Dept: VASCULAR SURGERY | Facility: CLINIC | Age: 61
End: 2021-04-16
Payer: COMMERCIAL

## 2021-04-16 VITALS — HEART RATE: 73 BPM | DIASTOLIC BLOOD PRESSURE: 71 MMHG | SYSTOLIC BLOOD PRESSURE: 101 MMHG

## 2021-04-16 VITALS
HEART RATE: 73 BPM | HEIGHT: 74 IN | WEIGHT: 205 LBS | TEMPERATURE: 97 F | BODY MASS INDEX: 26.31 KG/M2 | DIASTOLIC BLOOD PRESSURE: 72 MMHG | SYSTOLIC BLOOD PRESSURE: 103 MMHG

## 2021-04-16 PROCEDURE — 99072 ADDL SUPL MATRL&STAF TM PHE: CPT

## 2021-04-16 PROCEDURE — 99213 OFFICE O/P EST LOW 20 MIN: CPT

## 2021-04-16 PROCEDURE — 93970 EXTREMITY STUDY: CPT

## 2021-05-24 ENCOUNTER — APPOINTMENT (OUTPATIENT)
Dept: INTERNAL MEDICINE | Facility: CLINIC | Age: 61
End: 2021-05-24
Payer: COMMERCIAL

## 2021-05-24 VITALS
BODY MASS INDEX: 27.59 KG/M2 | TEMPERATURE: 98 F | RESPIRATION RATE: 16 BRPM | OXYGEN SATURATION: 97 % | WEIGHT: 215 LBS | HEART RATE: 60 BPM | SYSTOLIC BLOOD PRESSURE: 108 MMHG | HEIGHT: 74 IN | DIASTOLIC BLOOD PRESSURE: 72 MMHG

## 2021-05-24 DIAGNOSIS — I87.2 VENOUS INSUFFICIENCY (CHRONIC) (PERIPHERAL): ICD-10-CM

## 2021-05-24 PROCEDURE — 99214 OFFICE O/P EST MOD 30 MIN: CPT

## 2021-05-24 PROCEDURE — 99072 ADDL SUPL MATRL&STAF TM PHE: CPT

## 2021-05-24 RX ORDER — CHOLECALCIFEROL (VITAMIN D3) 1250 MCG
1.25 MG CAPSULE ORAL
Qty: 6 | Refills: 1 | Status: DISCONTINUED | COMMUNITY
Start: 2021-02-25 | End: 2021-05-24

## 2021-05-24 NOTE — PLAN
[FreeTextEntry1] : 1. The patient will remain on Xarelto 20 mg daily at this time.\par \par 2. The patient will now undergo a repeat d-dimer, as well as a CAT scan of the chest. I would like to see if the d-dimer has returned to a normal level of less than 150. It was initially 1274 on admission to the emergency room. I would also like to see if the infiltrates/infarct in the right lower lung zone has resolved on repeat CAT scan of the chest.\par \par 3. Pending the results of the above noted studies, will then make a decision as to whether or not the patient can discontinue the Xarelto after 3 months for a provoked pulmonary embolism/DVT, or whether or not he will need continued therapy. I will need to speak to his vascular surgeon, Dr. Burris, in this regard as well. Of note is the fact that the patient does have residual edema in the right lower extremity (some of which may have been secondary to prior venous disease after undergoing a greater saphenous vein ablation). 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 The patient will need to elevate the right lower extremity as well as wear compression stockings as tolerated.\par \par 5. Follow up with myself in 3 months with an office visit.

## 2021-05-24 NOTE — HISTORY OF PRESENT ILLNESS
[FreeTextEntry1] : The patient comes in for a new patient pulmonary consultation. [de-identified] : The patient is a 60-year-old white male, with a history of GERD, BPH, and hyperlipidemia, who presents for an initial evaluation.\par \par The patient states that he was in his usual state of health, until 2/1/21, when he fell while walking in the snow. He developed significant pain in his right foot. He was seen by an orthopedic surgeon, Dr. Greco, who performed an x-ray, and told him that he had an oblique fracture of the right fibula. He was placed in a walking boot. He was told to "stay off of it." The patient essentially sat in bed for several weeks. He was not given a\par \par Crutches. He only got out of bed to use the bathroom facility.\par \par On 2/15/21, the patient began to complain of discomfort in his right lower chest wall region. The patient radiated into the upper and back part of his chest. It progressively became worse over the ensuing 2 days. He then began to develop increasing shortness of breath. He was seen by his primary care physician, and sent to the emergency room. A workup including venous duplex Dopplers of the lower extremities as well as a CT angiogram of the chest. The patient is noted to have extensive clot in the right femoral vein, with multiple pulmonary emboli. There was slight right ventricular strain noted. He was started on intravenous heparin which he took in the hospital for 2 days, before being switched over to Xarelto. On 2/19, he began to expectorate dark red sputum. He was eventually discharged home on 2/19.\par \par Since he has been home, he has been feeling better overall. There has been a gradual decrease in the pleuritic chest pain. His oxygen saturations have been between 93 and 96%. The amount of blood in the sputum is also diminished. He has still, however, complained of fatigue. He now comes in for this assessment.\par \par The patient's pulmonary history is remarkable for one episode of walking pneumonia many years ago. He denies a history of cigarette smoking, asthma, TB or TB exposure. He does have mild chronic ankle swelling secondary to venous insufficiency. He did undergo surgery to ablate the veins in his right lower extremity many years ago. He is compliant with compression stockings. He denies postnasal drip or orthopnea. He denies recent travel. He does not have any pets. He was exposed to toxic fumes in the past and agitated works as a .\par \par

## 2021-05-24 NOTE — DATA REVIEWED
[FreeTextEntry1] : A pulmonary function test is performed. Lung volumes are within normal limits. Lung mechanics within normal limits, except for a mild decrease in the FEF 25-75. The DLCO is at the low limit of normal, and when corrected for alveolar volume is normal. The saturation is 96%. This represents a mild degree of obstruction. The DLCO is at the low end of normal.\par \par CAT scan angiogram of the chest, and venous duplex Dopplers are reviewed.\par \par A d-dimer performed on 2/17/21 was 1274.

## 2021-05-24 NOTE — PHYSICAL EXAM
[de-identified] : There is 1+ edema of the left lower extremity and 2+ edema of the right lower extremity.

## 2021-05-24 NOTE — PLAN
[FreeTextEntry1] : 1. At this time, it appears as if the patient has suffered a significant pulmonary embolism, with moderate clot burden. There also appears to have been a pulmonary infarction radiographically given the peripheral distribution of a triangular appearing infiltrate. The patient, of note, is also expectorating blood-tinged sputum which is most likely due to the infarction. This end, he would need to maintain the current dose of anticoagulation with Xarelto, at 15 mg b.i.d. for a total of 21 days. He will then be switched to a 20 mg daily dose thereafter, for the remaining 9 weeks. This would be 3 months total of therapy. This is the current standard of care for a provoked pulmonary embolism.\par \par 2. The patient will return in 3 months for a reevaluation. He'll undergo a d-dimer prior to being reevaluated in the office. I will also repeat a CAT scan of the chest to insure that the peripheral infiltrates, which most likely represent an infarct, have improved or resolved. If there is still an elevation of the d-dimer at that time, I would consider lengthening treatment of anticoagulation, possibly to 6 months. That decision will be made after he is reevaluated.\par \par 3. As noted above the patient will followup in 3 months. Will repeat spirometry at that visit if possible. A CAT scan will be reviewed as well.\par \par 4. All routine medical followup with his primary care physician, Dr.Boxer.

## 2021-05-24 NOTE — HISTORY OF PRESENT ILLNESS
[FreeTextEntry1] : The patient comes in today for a routine followup pulmonary evaluation.\par \par  [de-identified] : The patient states, but overall, he is doing relatively well. He was initially seen by myself, on 2/25/21 for evaluation of an acute pulmonary embolism. He was felt to have a provoked clot/DVT secondary to an oblique fracture of the right fibula. He had been sedentary for several weeks, before he noted the onset of symptoms of pleuritic chest pain and dyspnea. A CT angiogram revealed a fairly extensive clot burden as well as extensive clot in the right femoral vein.At the time that I evaluated him, he was on Xarelto. he was told to remain on 15 mg b.i.d., which he did for a total of 3 weeks, and he was then switched over to a dosage of 20 mg daily which she continues to take at this time.\par \par The patient states that he is improved overall. His breathlessness has resolved. He denies any hemoptysis or pleuritic chest pain.He did not undergo the followup CAT scan of the chest, or d-dimer He has been seeing his vascular surgeon, Dr. Burris, who has told him that he should elevate the leg and wear compression stockings. He has been fairly compliant with the compression stockings. Previously, the patient did undergo an ablation of the greater saphenous vein in the same leg several years ago. He now comes in for this reassessment.

## 2021-05-24 NOTE — PHYSICAL EXAM
[No Acute Distress] : no acute distress [Well Nourished] : well nourished [Well Developed] : well developed [Well-Appearing] : well-appearing [Normal Sclera/Conjunctiva] : normal sclera/conjunctiva [EOMI] : extraocular movements intact [Normal Oropharynx] : the oropharynx was normal [Normal Rate] : normal rate  [No Murmur] : no murmur heard [No Edema] : there was no peripheral edema [Non Tender] : non-tender [Non-distended] : non-distended [No Masses] : no abdominal mass palpated [No HSM] : no HSM [Normal Supraclavicular Nodes] : no supraclavicular lymphadenopathy [No CVA Tenderness] : no CVA  tenderness [No Spinal Tenderness] : no spinal tenderness [No Joint Swelling] : no joint swelling [Grossly Normal Strength/Tone] : grossly normal strength/tone [No Rash] : no rash [Coordination Grossly Intact] : coordination grossly intact [No Focal Deficits] : no focal deficits [Normal Gait] : normal gait [Deep Tendon Reflexes (DTR)] : deep tendon reflexes were 2+ and symmetric [Normal Affect] : the affect was normal [Normal Insight/Judgement] : insight and judgment were intact [de-identified] : There appears to be a palpable cord in the right upper medial thigh region. [Normal] : no acute distress, well nourished, well developed and well-appearing [No JVD] : no jugular venous distention [Supple] : supple [No Respiratory Distress] : no respiratory distress  [No Accessory Muscle Use] : no accessory muscle use [Clear to Auscultation] : lungs were clear to auscultation bilaterally [Regular Rhythm] : with a regular rhythm [Normal S1, S2] : normal S1 and S2 [No Extremity Clubbing/Cyanosis] : no extremity clubbing/cyanosis [Soft] : abdomen soft [Normal Bowel Sounds] : normal bowel sounds [Normal Posterior Cervical Nodes] : no posterior cervical lymphadenopathy [Normal Anterior Cervical Nodes] : no anterior cervical lymphadenopathy

## 2021-05-27 LAB — DEPRECATED D DIMER PPP IA-ACNC: <150 NG/ML DDU

## 2021-05-28 ENCOUNTER — NON-APPOINTMENT (OUTPATIENT)
Age: 61
End: 2021-05-28

## 2021-06-04 ENCOUNTER — OUTPATIENT (OUTPATIENT)
Dept: OUTPATIENT SERVICES | Facility: HOSPITAL | Age: 61
LOS: 1 days | End: 2021-06-04
Payer: COMMERCIAL

## 2021-06-04 ENCOUNTER — APPOINTMENT (OUTPATIENT)
Dept: CT IMAGING | Facility: CLINIC | Age: 61
End: 2021-06-04
Payer: COMMERCIAL

## 2021-06-04 DIAGNOSIS — Z90.89 ACQUIRED ABSENCE OF OTHER ORGANS: Chronic | ICD-10-CM

## 2021-06-04 DIAGNOSIS — K08.409 PARTIAL LOSS OF TEETH, UNSPECIFIED CAUSE, UNSPECIFIED CLASS: Chronic | ICD-10-CM

## 2021-06-04 DIAGNOSIS — I26.99 OTHER PULMONARY EMBOLISM WITHOUT ACUTE COR PULMONALE: ICD-10-CM

## 2021-06-04 PROCEDURE — 71250 CT THORAX DX C-: CPT | Mod: 26

## 2021-06-04 PROCEDURE — 71250 CT THORAX DX C-: CPT

## 2021-08-16 ENCOUNTER — APPOINTMENT (OUTPATIENT)
Dept: INTERNAL MEDICINE | Facility: CLINIC | Age: 61
End: 2021-08-16
Payer: COMMERCIAL

## 2021-08-16 VITALS
TEMPERATURE: 97.8 F | BODY MASS INDEX: 27.59 KG/M2 | HEIGHT: 74 IN | DIASTOLIC BLOOD PRESSURE: 62 MMHG | RESPIRATION RATE: 16 BRPM | WEIGHT: 215 LBS | SYSTOLIC BLOOD PRESSURE: 108 MMHG | OXYGEN SATURATION: 96 % | HEART RATE: 64 BPM

## 2021-08-16 DIAGNOSIS — I82.409 ACUTE EMBOLISM AND THROMBOSIS OF UNSPECIFIED DEEP VEINS OF UNSPECIFIED LOWER EXTREMITY: ICD-10-CM

## 2021-08-16 PROCEDURE — 99214 OFFICE O/P EST MOD 30 MIN: CPT

## 2021-08-16 NOTE — HISTORY OF PRESENT ILLNESS
[FreeTextEntry1] : The patient comes in today for a routine followup pulmonary evaluation.\par  [de-identified] : The patient states, that he is relatively stable at this time from a pulmonary standpoint. He discontinued the Xarelto, as I had recommended several months ago. He was treated for almost 4 months for what is felt to be a presumed to pulmonary embolism that he suffered back in February of this year. The followup CAT scan showed near total resolution of the prior infarction. He says it residual swelling of the right lower extremity. He denies any chest pain or pleuritic pain. There is no hemoptysis.\par \par With regards to the swelling in the right lower extremity, he continues to be followed by his vascular surgeon, Dr. Burris. He tends to be compliant with leg elevation and compression stocking as much as possible.\par \par The patient has noted, the onset of a cough productive of clear sputum. This has occurred approximately over the past month. The sputum is scant. He does occasionally have postnasal drip but he thinks it is not very significant. He spoke to his primary care physician, who felt that it may have been allergy mediated. He was told to take over-the-counter antihistamines, which he did for one week without much improvement. He now comes in for assessment.

## 2021-08-16 NOTE — PLAN
[FreeTextEntry1] :  1. Arnuity, 200 mcg, one puff daily for treatment of his cough. In reviewing his pulmonary function study that was performed in February, he did have evidence for airway obstruction. The cough may be a manifestation of airway reactivity or obstructive lung disease. We will observe his clinical response\par \par 2. Compression stockings will be continued for the right lower extremity\par \par 3. The patient will consider undergoing a thrombophilia evaluation by Dr. Pedroza.\par \par 4. Followup in 6 months with full pulmonary function testing\par \par 5. Routine medical followup with his primary care physician, Dr. Boxer.

## 2021-08-16 NOTE — PHYSICAL EXAM
[Normal] : no acute distress, well nourished, well developed and well-appearing [No JVD] : no jugular venous distention [Supple] : supple [No Respiratory Distress] : no respiratory distress  [No Accessory Muscle Use] : no accessory muscle use [Regular Rhythm] : with a regular rhythm [Normal S1, S2] : normal S1 and S2 [No Extremity Clubbing/Cyanosis] : no extremity clubbing/cyanosis [Soft] : abdomen soft [Normal Bowel Sounds] : normal bowel sounds [Normal Posterior Cervical Nodes] : no posterior cervical lymphadenopathy [Normal Anterior Cervical Nodes] : no anterior cervical lymphadenopathy [de-identified] : There are a few coarse breath sounds bilaterally, without focal wheezes. [de-identified] : There is 1+ edema of the left lower extremity and 2+ edema of the right lower extremity.

## 2022-02-09 DIAGNOSIS — Z20.822 CONTACT WITH AND (SUSPECTED) EXPOSURE TO COVID-19: ICD-10-CM

## 2022-02-15 ENCOUNTER — EMERGENCY (EMERGENCY)
Facility: HOSPITAL | Age: 62
LOS: 0 days | Discharge: HOME | End: 2022-02-15
Attending: EMERGENCY MEDICINE
Payer: COMMERCIAL

## 2022-02-15 VITALS
OXYGEN SATURATION: 98 % | SYSTOLIC BLOOD PRESSURE: 105 MMHG | HEART RATE: 79 BPM | DIASTOLIC BLOOD PRESSURE: 66 MMHG | TEMPERATURE: 98 F | RESPIRATION RATE: 18 BRPM

## 2022-02-15 VITALS — HEIGHT: 74 IN | WEIGHT: 212.08 LBS

## 2022-02-15 DIAGNOSIS — Z90.89 ACQUIRED ABSENCE OF OTHER ORGANS: Chronic | ICD-10-CM

## 2022-02-15 DIAGNOSIS — E78.5 HYPERLIPIDEMIA, UNSPECIFIED: ICD-10-CM

## 2022-02-15 DIAGNOSIS — Z88.1 ALLERGY STATUS TO OTHER ANTIBIOTIC AGENTS STATUS: ICD-10-CM

## 2022-02-15 DIAGNOSIS — K08.409 PARTIAL LOSS OF TEETH, UNSPECIFIED CAUSE, UNSPECIFIED CLASS: Chronic | ICD-10-CM

## 2022-02-15 DIAGNOSIS — M79.672 PAIN IN LEFT FOOT: ICD-10-CM

## 2022-02-15 DIAGNOSIS — L03.116 CELLULITIS OF LEFT LOWER LIMB: ICD-10-CM

## 2022-02-15 DIAGNOSIS — Z79.01 LONG TERM (CURRENT) USE OF ANTICOAGULANTS: ICD-10-CM

## 2022-02-15 LAB — SARS-COV-2 N GENE NPH QL NAA+PROBE: NOT DETECTED

## 2022-02-15 PROCEDURE — 73630 X-RAY EXAM OF FOOT: CPT | Mod: LT

## 2022-02-15 PROCEDURE — 99284 EMERGENCY DEPT VISIT MOD MDM: CPT

## 2022-02-15 PROCEDURE — 73610 X-RAY EXAM OF ANKLE: CPT | Mod: LT

## 2022-02-15 PROCEDURE — 73610 X-RAY EXAM OF ANKLE: CPT | Mod: 26,LT

## 2022-02-15 PROCEDURE — 93971 EXTREMITY STUDY: CPT | Mod: 26,LT

## 2022-02-15 PROCEDURE — 73630 X-RAY EXAM OF FOOT: CPT | Mod: 26,LT

## 2022-02-15 PROCEDURE — 99284 EMERGENCY DEPT VISIT MOD MDM: CPT | Mod: 25

## 2022-02-15 PROCEDURE — 93971 EXTREMITY STUDY: CPT | Mod: LT

## 2022-02-15 RX ORDER — CEPHALEXIN 500 MG
500 CAPSULE ORAL ONCE
Refills: 0 | Status: COMPLETED | OUTPATIENT
Start: 2022-02-15 | End: 2022-02-15

## 2022-02-15 RX ORDER — IBUPROFEN 200 MG
400 TABLET ORAL ONCE
Refills: 0 | Status: COMPLETED | OUTPATIENT
Start: 2022-02-15 | End: 2022-02-15

## 2022-02-15 RX ORDER — CEPHALEXIN 500 MG
1 CAPSULE ORAL
Qty: 28 | Refills: 0
Start: 2022-02-15 | End: 2022-02-21

## 2022-02-15 RX ADMIN — Medication 400 MILLIGRAM(S): at 13:37

## 2022-02-15 RX ADMIN — Medication 500 MILLIGRAM(S): at 13:37

## 2022-02-15 NOTE — ED STATDOCS - PROGRESS NOTE DETAILS
Alma Rosa Marsh for attending Dr. Gonzalez: 62 y/o male with a PMHx of arthritis, dyslipidemia, esophageal stricture, GERD, nevus, Peyronie's disease presents to the ED c/o left foot pain x3 days. Pain started after pt did yard work. Exam: Redness and swelling to medial aspect of plantar surface. Distal NVM intact. MDM: Pt with left foot pain. Possible early cellulitis vs foot sprain. Pt with hx of DVT. Plan: XR, US, abx. Lizz PGY3: Reviewed and discussed results with patient. Discussed importance of follow up and return precautions. Patient agrees with plan.

## 2022-02-15 NOTE — ED STATDOCS - PHYSICAL EXAMINATION
Physical Exam:  Gen: NAD, AOx3, non-toxic appearing, able to ambulate without assistance  Head: NCAT  HEENT: EOMI, PEERLA, normal conjunctiva, tongue midline, oral mucosa moist  Lung: CTAB, no respiratory distress, no wheezes/rhonchi/rales B/L, speaking in full sentences  CV: RRR, no murmurs, rubs or gallops, distal pulses 2+ b/l  MSK: L posterior medial foot TTP with redness and mild swelling/warmth to this area, no visible deformities, ROM normal in UE/LE  Neuro: No focal sensory or motor deficits  Skin: Warm, well perfused, no leg swelling  Psych: normal affect, calm

## 2022-02-15 NOTE — ED STATDOCS - NSFOLLOWUPINSTRUCTIONS_ED_ALL_ED_FT
- Continue all regular medications  - For pain, take tylenol or ibuprofen as directed on the packaging  - take cephalexin as prescribed   - Follow up with your primary doctor within 1 week  - You were given copies of labs and/or imaging results if applicable, please take them to your follow up appointments  - Return to the ER for fever, rapidly spreading rash or any worsening symptoms or concerns

## 2022-02-15 NOTE — ED STATDOCS - PATIENT PORTAL LINK FT
You can access the FollowMyHealth Patient Portal offered by [facility] by registering at the following website: [email or web address]. By joining [employer]’s FollowMyHealth portal, you will also be able to view your health information using other applications (apps) compatible with our system.

## 2022-02-15 NOTE — ED STATDOCS - ATTENDING CONTRIBUTION TO CARE
I, Lance Gonzalez MD, personally saw the patient with the resident, and completed the key components of the history and physical exam. I then discussed the management plan with the resident.

## 2022-02-15 NOTE — ED STATDOCS - OBJECTIVE STATEMENT
62yo male HLD, BPH p/w 4 days worsening L foot pain worse with palpation, movement and bearing weight, also notes redness and warmth at area of pain. Thought he may have injured it on Saturday but no precipitating twisting or trauma to ankle. denies fever, h/o diabetes.

## 2022-02-15 NOTE — ED ADULT TRIAGE NOTE - CHIEF COMPLAINT QUOTE
patient presenting ambulatory to ED c/o left foot pain since saturday. patient states he was working outside saturday and thinks he may have twisted his foot. denies fall, trauma. no pain meds taken PTA. patient able to ambulate independently but endorsing pain.

## 2022-02-15 NOTE — ED STATDOCS - CLINICAL SUMMARY MEDICAL DECISION MAKING FREE TEXT BOX
62yo male p/w atraumatic L foot pain with redness, warmth and swelling. No calf swelling or TTP. Likely cellulitis. Meds, XR, likely dc. 60yo male p/w atraumatic L foot pain with redness, warmth and swelling. No calf swelling or TTP. Possibly cellulitis vs tendonitis. Meds, XR, likely dc.

## 2022-02-17 ENCOUNTER — APPOINTMENT (OUTPATIENT)
Dept: ORTHOPEDIC SURGERY | Facility: CLINIC | Age: 62
End: 2022-02-17
Payer: COMMERCIAL

## 2022-02-17 DIAGNOSIS — M25.572 PAIN IN LEFT ANKLE AND JOINTS OF LEFT FOOT: ICD-10-CM

## 2022-02-17 DIAGNOSIS — S99.912A UNSPECIFIED INJURY OF LEFT ANKLE, INITIAL ENCOUNTER: ICD-10-CM

## 2022-02-17 DIAGNOSIS — M76.822 POSTERIOR TIBIAL TENDINITIS, LEFT LEG: ICD-10-CM

## 2022-02-17 PROCEDURE — 99213 OFFICE O/P EST LOW 20 MIN: CPT

## 2022-02-17 NOTE — PHYSICAL EXAM
[de-identified] : Left ankle Physical Examination:\par \par General: Alert and oriented x3.  In no acute distress.  Pleasant in nature with a normal affect.  No apparent respiratory distress. \par Erythema, Warmth, Rubor: Negative\par Swelling: Positive swelling over the medial side of the ankle over the posterior tibialis tendon distribution.\par \par ROM:\par 1. Dorsiflexion: 10 degrees\par 2. Plantarflexion: 40 degrees\par 3. Inversion: 10 degrees\par 4. Eversion: 10 degrees\par \par Tenderness to Palpation: \par 1. Lateral Malleolus: Negative\par 2. Medial Malleolus: Negative\par 3. Proximal Fibular Pain: Negative\par 4. Heel Pain: Negative\par 5. Cuboid: Negative\par 6. Navicular: Negative\par 7. Tibiotalar Joint: Negative\par 8. Subtalar Joint: Negative\par 9. Posterior Recess: Negative\par \par Tendon Pain:\par 1. Achilles: Negative\par 2. Peroneals: Negative\par 3. Posterior Tibialis: Positive\par 4. Tibialis Anterior: Negative\par \par Ligament Pain:\par 1. ATFL: Negative\par 2. CFL: Negative \par 3. PTFL: Negative\par 4. Deltoid Ligaments: Negative\par 5. Lis Franc Ligament: Negative\par \par Stability: \par 1. Anterior Drawer: Negative\par 2. Posterior Drawer: Negative\par \par Strength: 5/5 TA/GS/EHL\par \par Pulses: 2+ DP/PT Pulses\par \par Neuro: Intact motor and sensory\par \par Additional Test:\par 1. Calcaneal Squeeze Test: Negative\par 2. Syndesmosis Squeeze Test: Negative [de-identified] : ACC: [id number] EXAM: XR FOOT COMP MIN 3 VIEWS LT\par ACC: [id number] EXAM: XR ANKLE COMP MIN 3 VIEWS LT\par \par PROCEDURE DATE: 02/15/2022\par \par \par \par INTERPRETATION: Left ankle and left foot. Patient had twisting injury 4 days ago and has history of blood clots.\par \par Left ankle. 3 views.\par \par There is mild swelling around the ankle. No bone destruction or fracture.\par \par There is mild calcification in the lower Achilles tendon.\par \par No bone destruction or fracture.\par \par Left foot. 3 views.\par \par Slight first MTP degeneration. No fracture.\par \par IMPRESSION: No acute bony finding. Incidental findings as above.\par \par --- End of Report ---\par \par \par \par \par \par PAUL REYNA MD; Attending Radiologist\par This document has been electronically signed. Feb 15 2022 1:07PM

## 2022-02-17 NOTE — HISTORY OF PRESENT ILLNESS
[FreeTextEntry1] : 2/17/2022: 60 y/o male presenting for evaluation of left ankle pain.  The pain started this past Sunday into Monday.  He states that he was doing yard work with improper shoes and started to have medial sided ankle pain and swelling.  The patient did state that since Monday the pain is subsiding quite a bit but he still has 3 out of 10 pain and some swelling on the medial side of the ankle.  He presents wearing regular sneakers today in the office.  He has no numbness or tingling in the foot.  No other complaints.\par \par \par 3/15/2021: 60 year old male presenting with right ankle pain. The patient’s pain is noted to be a 1/10. The pain is noted to be 75% improved, and the swelling noted to be 50% improved compared to the previous visit. The patient does report some loss of sensation on the bottom of the foot. The patient presents wearing a CAM boot. The patient presents ambulating in crutches.  He is currently taking no pain medication. Patient is currently on Xarelto.  He is taking vitamin D weekly. No other complaints at this time.

## 2022-02-17 NOTE — DISCUSSION/SUMMARY
[de-identified] : Assessment: Left ankle injury\par \par Plan:\par 1. Home exercise and stretching program.  An ASO brace was given to him today to support the ankle.\par 2. NSAIDs/Tylenol as needed for pain.  Enteric-coated Naprosyn 500 mg prescribed.\par 3. Return to normal activities as tolerated. \par 4. Continue with ice and heat therapy. \par 5. All questions answered.  Follow-up as needed. If pain persists consider advanced imaging in the future.  The patient understood the treatment plan.

## 2022-02-18 ENCOUNTER — APPOINTMENT (OUTPATIENT)
Dept: INTERNAL MEDICINE | Facility: CLINIC | Age: 62
End: 2022-02-18
Payer: COMMERCIAL

## 2022-02-18 VITALS
SYSTOLIC BLOOD PRESSURE: 95 MMHG | WEIGHT: 220 LBS | HEIGHT: 73 IN | TEMPERATURE: 97.9 F | BODY MASS INDEX: 29.16 KG/M2 | RESPIRATION RATE: 16 BRPM | OXYGEN SATURATION: 97 % | HEART RATE: 87 BPM | DIASTOLIC BLOOD PRESSURE: 54 MMHG

## 2022-02-18 PROCEDURE — 94729 DIFFUSING CAPACITY: CPT

## 2022-02-18 PROCEDURE — 99215 OFFICE O/P EST HI 40 MIN: CPT | Mod: 25

## 2022-02-18 PROCEDURE — ZZZZZ: CPT

## 2022-02-18 PROCEDURE — 94727 GAS DIL/WSHOT DETER LNG VOL: CPT

## 2022-02-18 PROCEDURE — 94010 BREATHING CAPACITY TEST: CPT

## 2022-02-18 NOTE — PLAN
[FreeTextEntry1] : 1. Continue with Arnuity, 200 mcg, one puff daily for treatment of mild persistent asthma with slight airway reactivity. As noted, he may have a component of cough variant asthma.\par \par 2. Exercise as tolerated.\par \par 3. Followup routinely with his primary care physician, Dr. boxer.\par \par 4. Follow up with myself in one year with full pulmonary function testing.

## 2022-02-18 NOTE — HISTORY OF PRESENT ILLNESS
[FreeTextEntry1] : The patient comes in for a yearly pulmonary evaluation\par  [de-identified] : The patient states, that from a pulmonary standpoint, he is doing well. At the time of the last visit, in August of 2021, the patient had been complaining of a cough productive of clear sputum. It was felt that there may have been an allergy mediated process. He did not have any help with regards to the cough or postnasal drip, after taking over-the-counter antihistamines. His pulmonary function, was noted to have a decrease in the FEF 25-75, so I decided to treat him with an inhaled corticosteroid in the form of Arnuity, 200 mcg, one puff daily. He notes, that after approximately 3 weeks of this medication, that the cough resolved completely. It has not returned. He denies any wheezing or sputum production. He is now totally asymptomatic.\par \par With regards to the prior pulmonary embolism, he remains asymptomatic. He denies any chest pain or pleuritic pain. He still has some residual swelling of his right lower extremity, for which he is followed by his vascular surgeon. He remains active, he has not been performing any formal type of exercise. He now comes in for this assessment.

## 2022-02-18 NOTE — PHYSICAL EXAM
[No Acute Distress] : no acute distress [Well Nourished] : well nourished [Well Developed] : well developed [Well-Appearing] : well-appearing [Normal Sclera/Conjunctiva] : normal sclera/conjunctiva [EOMI] : extraocular movements intact [Normal Oropharynx] : the oropharynx was normal [No JVD] : no jugular venous distention [Supple] : supple [No Respiratory Distress] : no respiratory distress  [No Accessory Muscle Use] : no accessory muscle use [Clear to Auscultation] : lungs were clear to auscultation bilaterally [Normal Rate] : normal rate  [Regular Rhythm] : with a regular rhythm [Normal S1, S2] : normal S1 and S2 [No Murmur] : no murmur heard [No Edema] : there was no peripheral edema [No Extremity Clubbing/Cyanosis] : no extremity clubbing/cyanosis [Soft] : abdomen soft [Non Tender] : non-tender [Non-distended] : non-distended [No Masses] : no abdominal mass palpated [No HSM] : no HSM [Normal Bowel Sounds] : normal bowel sounds [Normal Supraclavicular Nodes] : no supraclavicular lymphadenopathy [Normal Posterior Cervical Nodes] : no posterior cervical lymphadenopathy [Normal Anterior Cervical Nodes] : no anterior cervical lymphadenopathy [No CVA Tenderness] : no CVA  tenderness [No Spinal Tenderness] : no spinal tenderness [No Joint Swelling] : no joint swelling [Grossly Normal Strength/Tone] : grossly normal strength/tone [No Rash] : no rash [Coordination Grossly Intact] : coordination grossly intact [No Focal Deficits] : no focal deficits [Normal Gait] : normal gait [Deep Tendon Reflexes (DTR)] : deep tendon reflexes were 2+ and symmetric [Normal Affect] : the affect was normal [Normal Insight/Judgement] : insight and judgment were intact [de-identified] : There appears to be a palpable cord in the right upper medial thigh region.

## 2022-02-18 NOTE — DATA REVIEWED
[FreeTextEntry1] : A pulmonary function test is performed. Lung volumes are within normal limits. Lung mechanics are normal, except for a slight decrease in the FEF 25-75 at 71%. Bronchodilator reactivity is not assessed. The DLCO and saturation are maintained. This represents a slight degree of obstruction. The patient may have a component of mild cough variant asthma, clinically.

## 2022-03-10 ENCOUNTER — RX RENEWAL (OUTPATIENT)
Age: 62
End: 2022-03-10

## 2022-06-07 ENCOUNTER — RX RENEWAL (OUTPATIENT)
Age: 62
End: 2022-06-07

## 2023-02-03 ENCOUNTER — APPOINTMENT (OUTPATIENT)
Dept: INTERNAL MEDICINE | Facility: CLINIC | Age: 63
End: 2023-02-03
Payer: COMMERCIAL

## 2023-02-03 VITALS
HEART RATE: 71 BPM | RESPIRATION RATE: 16 BRPM | HEIGHT: 73 IN | WEIGHT: 203 LBS | BODY MASS INDEX: 26.9 KG/M2 | TEMPERATURE: 97.7 F | OXYGEN SATURATION: 98 % | SYSTOLIC BLOOD PRESSURE: 101 MMHG | DIASTOLIC BLOOD PRESSURE: 66 MMHG

## 2023-02-03 PROCEDURE — ZZZZZ: CPT

## 2023-02-03 PROCEDURE — 99215 OFFICE O/P EST HI 40 MIN: CPT | Mod: 25

## 2023-02-03 PROCEDURE — 94060 EVALUATION OF WHEEZING: CPT

## 2023-02-03 PROCEDURE — 94729 DIFFUSING CAPACITY: CPT

## 2023-02-03 PROCEDURE — 94727 GAS DIL/WSHOT DETER LNG VOL: CPT

## 2023-02-03 RX ORDER — ALFUZOSIN HYDROCHLORIDE 10 MG/1
TABLET, EXTENDED RELEASE ORAL
Refills: 0 | Status: DISCONTINUED | COMMUNITY
End: 2023-02-03

## 2023-02-03 RX ORDER — NAPROXEN 500 MG/1
500 TABLET, DELAYED RELEASE ORAL
Qty: 60 | Refills: 0 | Status: DISCONTINUED | COMMUNITY
Start: 2022-02-17 | End: 2023-02-03

## 2023-02-03 RX ORDER — NAPROXEN 500 MG/1
500 TABLET, DELAYED RELEASE ORAL
Qty: 60 | Refills: 0 | Status: DISCONTINUED | COMMUNITY
Start: 2022-03-10 | End: 2023-02-03

## 2023-02-03 RX ORDER — RIVAROXABAN 2.5 MG/1
TABLET, FILM COATED ORAL
Refills: 0 | Status: DISCONTINUED | COMMUNITY
End: 2023-02-03

## 2023-02-03 RX ORDER — DOXAZOSIN 4 MG/1
4 TABLET ORAL
Refills: 0 | Status: ACTIVE | COMMUNITY

## 2023-02-03 NOTE — HISTORY OF PRESENT ILLNESS
[FreeTextEntry1] : The patient comes in for a yearly pulmonary evaluation\par  [de-identified] : The patient states, from a pulmonary standpoint, he is doing extremely well.  He remains compliant with his Arnuity, taking it on a daily basis.  He has not had any exacerbations of his underlying asthma.  There has been no cough, wheeze, or breathlessness.  He remains active, and is asymptomatic.\par \par Unfortunately, the patient is suffering from severe eczema.  He has been treated by a dermatologist with a multitude of topical agents.  They are now, in the process of attempting to see if they can get him covered for Dupixent.  Once she is able to begin this medication, we then may be able to wean him from the Arnuity completely, and just treat him with the Dupixent.  He now comes in for this assessment

## 2023-02-03 NOTE — DATA REVIEWED
[FreeTextEntry1] : A pulmonary function test was performed.  Lung volumes and flow rates are within normal limits.  Slight bronchodilator reactivity is demonstrated.  The DLCO and saturation are maintained.  This represents normal physiologic function.  Slight airway reactivity is noted.  When compared to the PFT performed in 2/21, the flow rates and the DLCO have improved.

## 2023-02-03 NOTE — PLAN
[FreeTextEntry1] : 1.  We will now decrease the strength of the Arnuity down to 100 mcg, 1 puff daily.\par \par 2.  I will await to see his clinical response to the institution of the Dupixent, which is being contemplated for treatment of his severe eczema.\par \par 3.  Follow-up in 6 months with prepro spirometry and fractional excretion of nitric oxide.  If he is doing well on the Dupixent, and his flow rates are unchanged after decreasing the strength of the Arnuity, I will then consider discontinuing the inhaled corticosteroid completely, and maintaining him on just Dupixent alone for treatment of both the eczema and his underlying asthma.\par \par 4.  Routine medical follow-up with his primary care physician, Dr. Boxer.

## 2023-02-03 NOTE — PHYSICAL EXAM
[No Acute Distress] : no acute distress [Well Nourished] : well nourished [Well Developed] : well developed [Well-Appearing] : well-appearing [Normal Sclera/Conjunctiva] : normal sclera/conjunctiva [EOMI] : extraocular movements intact [Normal Oropharynx] : the oropharynx was normal [No JVD] : no jugular venous distention [Supple] : supple [No Respiratory Distress] : no respiratory distress  [No Accessory Muscle Use] : no accessory muscle use [Clear to Auscultation] : lungs were clear to auscultation bilaterally [Normal Rate] : normal rate  [Regular Rhythm] : with a regular rhythm [Normal S1, S2] : normal S1 and S2 [No Murmur] : no murmur heard [No Edema] : there was no peripheral edema [No Extremity Clubbing/Cyanosis] : no extremity clubbing/cyanosis [Soft] : abdomen soft [Non Tender] : non-tender [Non-distended] : non-distended [No Masses] : no abdominal mass palpated [No HSM] : no HSM [Normal Bowel Sounds] : normal bowel sounds [Normal Supraclavicular Nodes] : no supraclavicular lymphadenopathy [Normal Posterior Cervical Nodes] : no posterior cervical lymphadenopathy [Normal Anterior Cervical Nodes] : no anterior cervical lymphadenopathy [No CVA Tenderness] : no CVA  tenderness [No Spinal Tenderness] : no spinal tenderness [No Joint Swelling] : no joint swelling [Grossly Normal Strength/Tone] : grossly normal strength/tone [No Rash] : no rash [Coordination Grossly Intact] : coordination grossly intact [No Focal Deficits] : no focal deficits [Normal Gait] : normal gait [Deep Tendon Reflexes (DTR)] : deep tendon reflexes were 2+ and symmetric [Normal Affect] : the affect was normal [Normal Insight/Judgement] : insight and judgment were intact [de-identified] : There appears to be a palpable cord in the right upper medial thigh region.

## 2023-02-26 ENCOUNTER — EMERGENCY (EMERGENCY)
Facility: HOSPITAL | Age: 63
LOS: 0 days | Discharge: HOME | End: 2023-02-26
Attending: STUDENT IN AN ORGANIZED HEALTH CARE EDUCATION/TRAINING PROGRAM
Payer: COMMERCIAL

## 2023-02-26 VITALS
SYSTOLIC BLOOD PRESSURE: 119 MMHG | TEMPERATURE: 98 F | OXYGEN SATURATION: 98 % | HEART RATE: 93 BPM | DIASTOLIC BLOOD PRESSURE: 72 MMHG | RESPIRATION RATE: 18 BRPM | HEIGHT: 74 IN | WEIGHT: 207.9 LBS

## 2023-02-26 DIAGNOSIS — R21 RASH AND OTHER NONSPECIFIC SKIN ERUPTION: ICD-10-CM

## 2023-02-26 DIAGNOSIS — R20.2 PARESTHESIA OF SKIN: ICD-10-CM

## 2023-02-26 DIAGNOSIS — E78.5 HYPERLIPIDEMIA, UNSPECIFIED: ICD-10-CM

## 2023-02-26 DIAGNOSIS — Z88.1 ALLERGY STATUS TO OTHER ANTIBIOTIC AGENTS STATUS: ICD-10-CM

## 2023-02-26 DIAGNOSIS — Z79.01 LONG TERM (CURRENT) USE OF ANTICOAGULANTS: ICD-10-CM

## 2023-02-26 DIAGNOSIS — K21.9 GASTRO-ESOPHAGEAL REFLUX DISEASE WITHOUT ESOPHAGITIS: ICD-10-CM

## 2023-02-26 DIAGNOSIS — Z90.89 ACQUIRED ABSENCE OF OTHER ORGANS: ICD-10-CM

## 2023-02-26 DIAGNOSIS — K08.409 PARTIAL LOSS OF TEETH, UNSPECIFIED CAUSE, UNSPECIFIED CLASS: Chronic | ICD-10-CM

## 2023-02-26 DIAGNOSIS — M17.12 UNILATERAL PRIMARY OSTEOARTHRITIS, LEFT KNEE: ICD-10-CM

## 2023-02-26 DIAGNOSIS — G47.00 INSOMNIA, UNSPECIFIED: ICD-10-CM

## 2023-02-26 DIAGNOSIS — Z90.89 ACQUIRED ABSENCE OF OTHER ORGANS: Chronic | ICD-10-CM

## 2023-02-26 DIAGNOSIS — Z98.818 OTHER DENTAL PROCEDURE STATUS: ICD-10-CM

## 2023-02-26 DIAGNOSIS — N48.6 INDURATION PENIS PLASTICA: ICD-10-CM

## 2023-02-26 LAB
BABESIA MICROTI PCR, BLD RESULT: SIGNIFICANT CHANGE UP
HIV 1 & 2 AB SERPL IA.RAPID: SIGNIFICANT CHANGE UP

## 2023-02-26 PROCEDURE — 87484 EHRLICHA CHAFFEENSIS AMP PRB: CPT

## 2023-02-26 PROCEDURE — 86703 HIV-1/HIV-2 1 RESULT ANTBDY: CPT

## 2023-02-26 PROCEDURE — 86618 LYME DISEASE ANTIBODY: CPT

## 2023-02-26 PROCEDURE — 86645 CMV ANTIBODY IGM: CPT

## 2023-02-26 PROCEDURE — 99283 EMERGENCY DEPT VISIT LOW MDM: CPT

## 2023-02-26 PROCEDURE — 99284 EMERGENCY DEPT VISIT MOD MDM: CPT

## 2023-02-26 PROCEDURE — 87468 ANAPLSMA PHGCYTOPHLM AMP PRB: CPT

## 2023-02-26 PROCEDURE — 86663 EPSTEIN-BARR ANTIBODY: CPT

## 2023-02-26 PROCEDURE — 36415 COLL VENOUS BLD VENIPUNCTURE: CPT

## 2023-02-26 PROCEDURE — 86664 EPSTEIN-BARR NUCLEAR ANTIGEN: CPT

## 2023-02-26 PROCEDURE — 87798 DETECT AGENT NOS DNA AMP: CPT

## 2023-02-26 PROCEDURE — 86665 EPSTEIN-BARR CAPSID VCA: CPT

## 2023-02-26 NOTE — ED STATDOCS - SKIN, MLM
diffused macular papillar rash to ant post trunk, LE and Ue, no blanching diffused macular papular rash to ant post trunk, LE and UE b/l, no mucous membrane involvement; no sloughing of skin

## 2023-02-26 NOTE — ED STATDOCS - CLINICAL SUMMARY MEDICAL DECISION MAKING FREE TEXT BOX
61 y/o with rash x 1 year, spoke at length with pt at bedside. Will do tick panel and have pt f/u with rheumatologist and infectious disease, and private dermatologist as outpatient.

## 2023-02-26 NOTE — ED STATDOCS - ATTENDING APP SHARED VISIT CONTRIBUTION OF CARE
I, Aarti Phillips DO,  performed the initial face to face bedside interview with this patient regarding history of present illness, review of symptoms and relevant past medical, social and family history.  I completed an independent physical examination.  I was the initial provider who evaluated this patient.   I personally saw the patient and performed a substantive portion of the visit including all aspects of the medical decision making.  I have signed out the follow up of any pending tests (i.e. labs, radiological studies) to the ACP.  I have communicated the patient’s plan of care and disposition with the ACP.  The history, relevant review of systems, past medical and surgical history, medical decision making, and physical examination was documented by the scribe in my presence and I attest to the accuracy of the documentation.

## 2023-02-26 NOTE — ED STATDOCS - PATIENT PORTAL LINK FT
You can access the FollowMyHealth Patient Portal offered by [facility] by registering at the following website: [email or web address]. By joining [facility] [employer]’s FollowMyHealth portal, you will also be able to view your health information using other applications (apps) compatible with our system.

## 2023-02-26 NOTE — ED STATDOCS - PROGRESS NOTE DETAILS
negative HIV.  Further infectious dx testing pending.  Advised patient ID and rheumatology follow up.  Reviewed symptom management and return precautions -Ana Laura Varghese PA-C

## 2023-02-26 NOTE — ED STATDOCS - CPE ED RESP NORM
bypass existing patient for 10 year screening.  new patient forms reviewed and verified. bypass existing patient for 10 year screening.  new patient forms reviewed and verified.Having an EGD also because of heartburn and dysphagia. BR
normal...

## 2023-02-26 NOTE — ED STATDOCS - OBJECTIVE STATEMENT
63 y/o M with a PMHx of nevus of L upper back, HLD, Peyronie's disease, arthritis, GERD, and esophageal stricture presents to the ED c/o worsening diffused body rash since 02/24, but ongoing for a year. Pt tested positive for allergy to MI and nickel. Has had several treatments such as prednisone, which did help the first time, but not the second time. Has tried allegra, Dupixent (1 dose of 60 mg), zyrtec, and claritin without much relief. States chronic insomnia due to rash, with occasional pins and needles, blisters, and tactile warmth of rash. Itchiness is waxing and waning, but is persistent otherwise and has not changed in quality. Rash is worse at night. Has been evaluated by a dermatologist and allergist. 61 y/o M with a PMHx of nevus of L upper back, HLD, Peyronie's disease, arthritis, GERD, and esophageal stricture presents to the ED c/o worsening diffused body rash since 02/24, but ongoing for a year; was on medrol dose pack in past;  Has tried allegra, Dupixent (1 dose of 60 mg), zyrtec, and claritin without much relief. States chronic insomnia due to rash, with occasional pins and needles, blisters, and tactile warmth of rash. Itchiness is waxing and waning, but is persistent otherwise and has not changed in quality. Rash is worse at night. Has been evaluated by a dermatologist and allergist.

## 2023-02-26 NOTE — ED ADULT TRIAGE NOTE - CHIEF COMPLAINT QUOTE
Pt presented to ER c/o full body rash. Pt stated he started noticing it in March 2022 and has been worsening for months, Pt stated on Friday it started worsening again. Pt has been to dermatologists and has tried oral and topical steroids with no relief. Pt denies CP, SOB, fevers.

## 2023-02-26 NOTE — ED STATDOCS - CHIEF COMPLAINT
[FreeTextEntry1] : 31-year-old woman history of rheumatoid arthritis on Humira and methotrexate doing well however has had some persistent although significantly improving left hip pain following a run still has some pain on full external rotation but no pain with walking or other activity other joints are normal she has had no morning stiffness no swelling no tenderness no pain about the joints
The patient is a 62y year old Male complaining of rash.

## 2023-02-27 LAB
B BURGDOR C6 AB SER-ACNC: NEGATIVE — SIGNIFICANT CHANGE UP
B BURGDOR IGG+IGM SER-ACNC: 0.32 INDEX — SIGNIFICANT CHANGE UP (ref 0.01–0.89)
CMV IGM FLD-ACNC: <8 AU/ML — SIGNIFICANT CHANGE UP
CMV IGM SERPL QL: NEGATIVE — SIGNIFICANT CHANGE UP
EBV EA AB SER IA-ACNC: 34.9 U/ML — HIGH
EBV EA AB TITR SER IF: POSITIVE
EBV EA IGG SER-ACNC: POSITIVE
EBV NA IGG SER IA-ACNC: 86.3 U/ML — HIGH
EBV PATRN SPEC IB-IMP: SIGNIFICANT CHANGE UP
EBV VCA IGG AVIDITY SER QL IA: POSITIVE
EBV VCA IGM SER IA-ACNC: 158 U/ML — HIGH
EBV VCA IGM SER IA-ACNC: 18.3 U/ML — SIGNIFICANT CHANGE UP
EBV VCA IGM TITR FLD: NEGATIVE — SIGNIFICANT CHANGE UP

## 2023-03-01 LAB
A PHAGOCYTOPH DNA BLD QL NAA+PROBE: NEGATIVE — SIGNIFICANT CHANGE UP
E CHAFFEENSIS DNA BLD QL NAA+PROBE: NEGATIVE — SIGNIFICANT CHANGE UP
E EWINGII DNA SPEC QL NAA+PROBE: NEGATIVE — SIGNIFICANT CHANGE UP
EHRLICHIA DNA SPEC QL NAA+PROBE: NEGATIVE — SIGNIFICANT CHANGE UP

## 2023-04-05 ENCOUNTER — NON-APPOINTMENT (OUTPATIENT)
Age: 63
End: 2023-04-05

## 2023-04-05 ENCOUNTER — APPOINTMENT (OUTPATIENT)
Dept: RHEUMATOLOGY | Facility: CLINIC | Age: 63
End: 2023-04-05
Payer: COMMERCIAL

## 2023-04-05 VITALS
SYSTOLIC BLOOD PRESSURE: 118 MMHG | OXYGEN SATURATION: 96 % | HEART RATE: 86 BPM | HEIGHT: 74 IN | DIASTOLIC BLOOD PRESSURE: 68 MMHG | WEIGHT: 214 LBS | TEMPERATURE: 97.7 F | BODY MASS INDEX: 27.46 KG/M2

## 2023-04-05 PROCEDURE — 99204 OFFICE O/P NEW MOD 45 MIN: CPT

## 2023-04-05 NOTE — ASSESSMENT
[FreeTextEntry1] : 62M with one year of rash, recently diagnosed with bullous pemphigoid from skin biopsy a couple of months ago, on MMF and prednisone 50 mg QD currently (managed by dermatology) here for additional evaluation.\par \par I informed the patient that as a rheumatologist I do not treat bullous pemphigoid and that he should follow up with his dermatologist. I provided reassurance that he has only been on MMF for past 2 weeks and not at the highest dose, so it may take more time for lesions to improve/resolve.\par \par He has no additional ROS for other autoimmune conditions including joint pain/swelling, oral ulcers, hair or weight loss, sicca symptoms, hematuria, Raynauds, or unprovoked DVT's. Does not need to return to rheumatology for followup.

## 2023-04-05 NOTE — HISTORY OF PRESENT ILLNESS
[FreeTextEntry1] : 62M with PMH asthma, recent diagnosis of bulous pemphigoid 2 weeks ago, under care of dermatology, \par on  mg and prednisone currently on 50 mg QD along with topicals. Also on abx doxycycline a few months ago. \par \par Rash is improved but still getting patchy raised areas over torso, blisters under thigh. Back is itchy. \par \par No joint pain or swelling, no oral or nasal ulcers. \par No dry eyes or dry mouth. \par No hematuria. No hair or weight loss. No Raynauds. No dysphagia. \par hx of DVT and PE 2 years ago, provoked, after breaking ankle and being sedentary.  [Weight Loss] : no weight loss [Malar Facial Rash] : no malar facial rash [Skin Lesions] : skin lesions [Dry Mouth] : no dry mouth [Dysphagia] : no dysphagia [Shortness of Breath] : no shortness of breath [Chest Pain] : no chest pain [Arthralgias] : no arthralgias [Joint Swelling] : no joint swelling [Joint Warmth] : no joint warmth [Morning Stiffness] : no morning stiffness [Visual Changes] : no visual changes [Eye Pain] : no eye pain [Eye Redness] : no eye redness [Dry Eyes] : no dry eyes

## 2023-05-16 ENCOUNTER — APPOINTMENT (OUTPATIENT)
Dept: PEDIATRIC ALLERGY IMMUNOLOGY | Facility: CLINIC | Age: 63
End: 2023-05-16

## 2023-08-01 ENCOUNTER — APPOINTMENT (OUTPATIENT)
Dept: INTERNAL MEDICINE | Facility: CLINIC | Age: 63
End: 2023-08-01
Payer: MEDICARE

## 2023-08-01 ENCOUNTER — NON-APPOINTMENT (OUTPATIENT)
Age: 63
End: 2023-08-01

## 2023-08-01 VITALS
HEART RATE: 98 BPM | WEIGHT: 212 LBS | OXYGEN SATURATION: 96 % | SYSTOLIC BLOOD PRESSURE: 110 MMHG | BODY MASS INDEX: 27.21 KG/M2 | DIASTOLIC BLOOD PRESSURE: 56 MMHG | HEIGHT: 74 IN | TEMPERATURE: 98.8 F

## 2023-08-01 PROCEDURE — 94060 EVALUATION OF WHEEZING: CPT

## 2023-08-01 PROCEDURE — 99214 OFFICE O/P EST MOD 30 MIN: CPT | Mod: 25

## 2023-08-01 RX ORDER — PREDNISONE 10 MG/1
TABLET ORAL
Refills: 0 | Status: ACTIVE | COMMUNITY

## 2023-08-01 RX ORDER — MYCOPHENOLATE MOFETIL 500 MG/1
500 TABLET, FILM COATED ORAL
Refills: 0 | Status: ACTIVE | COMMUNITY

## 2023-08-01 NOTE — ADDENDUM
[FreeTextEntry1] : I, Ramirez Carlton, documented this note as a scribe on behalf of Dr. Memo Palacios MD on 08/01/2023.

## 2023-08-01 NOTE — PLAN
[FreeTextEntry1] : 1.  Continue current medications as previously outlined.  2.  The patient will continue a slow taper of his prednisone, as per dermatology, for treatment of his bullous pemphigoid.  He will remain on CellCept under their direction, probably for the next several years.  3.  Cardiovascular exercise regimen has been recommended.  He has been told to start slowly with an excellent stretching program  4.  Routine medical follow-up with his primary care physician, Dr. Boxer.  5.  Follow-up with myself in 6 months with full pulmonary function testing.

## 2023-08-01 NOTE — HISTORY OF PRESENT ILLNESS
[FreeTextEntry1] : The patient presents for routine pulmonary evaluation. [de-identified] : The patient is feeling generally well. He presents today with underlying asthma and a history of pulmonary embolism two years ago, as well as a history of  recently-diagnosed bullous pemphigoid.  The patient was recently  diagnosed with bullous pemphigoid, initially involving his legs. Associated  pain was severe, limiting sleep to an hour nightly. He is now on Cellcept 500 mg daily, which he has been taking for the past 3 months with improvement of symptoms. He is also on prednisone, down to 30 mg daily from 60 mg daily when it was started five months ago.   In regards to his asthma, he is maintained on Arnuity 100 mcg daily monotherapy. He is doing well from a pulmonary standpoint. At the time of his last visit here, he was doing well on Arnuity and had not had any asthma exacerbation. Since then, he again has not had any asthma flares; of note, he recently ceased Arnuity for five days approximately one month ago, during which time he had mild wheezing which resolved after restarting Arnuity.  He denies any other acute constitutional symptoms at present. He comes into this assessment.

## 2023-08-01 NOTE — DATA REVIEWED
[FreeTextEntry1] : Spirometric analysis reveals a mild degree of obstruction.  Mild bronchodilator reactivity is demonstrated at this time.

## 2023-08-01 NOTE — PHYSICAL EXAM
[Normal] : no acute distress, well nourished, well developed and well-appearing [No JVD] : no jugular venous distention [Supple] : supple [No Respiratory Distress] : no respiratory distress  [No Accessory Muscle Use] : no accessory muscle use [Regular Rhythm] : with a regular rhythm [Normal S1, S2] : normal S1 and S2 [No Extremity Clubbing/Cyanosis] : no extremity clubbing/cyanosis [Soft] : abdomen soft [Normal Bowel Sounds] : normal bowel sounds [Clear to Auscultation] : lungs were clear to auscultation bilaterally [Coordination Grossly Intact] : coordination grossly intact [Normal Gait] : normal gait [Normal Affect] : the affect was normal [Normal Insight/Judgement] : insight and judgment were intact [de-identified] : There is 1+ edema of the left lower extremity and 2+ edema of the right lower extremity. [de-identified] : There are residual lesions primarily on his back, which are flat and slightly red, due to the bullous pemphigoid which is resolving.

## 2023-08-01 NOTE — END OF VISIT
[FreeTextEntry3] : All medical record entries made by the Scribe were at my, Dr. Memo Palacios MD, direction and personally dictated by me on 08/01/2023. I have reviewed the chart and agree that the record accurately reflects my personal performance of the history, physical exam, assessment and plan. I have also personally directed, reviewed, and agreed with the chart. [Time Spent: ___ minutes] : I have spent [unfilled] minutes of time on the encounter.

## 2023-09-07 ENCOUNTER — RX RENEWAL (OUTPATIENT)
Age: 63
End: 2023-09-07

## 2023-10-08 ENCOUNTER — NON-APPOINTMENT (OUTPATIENT)
Age: 63
End: 2023-10-08

## 2023-11-13 ENCOUNTER — APPOINTMENT (OUTPATIENT)
Dept: OTOLARYNGOLOGY | Facility: CLINIC | Age: 63
End: 2023-11-13
Payer: MEDICARE

## 2023-11-13 VITALS — HEIGHT: 74 IN | BODY MASS INDEX: 27.59 KG/M2 | WEIGHT: 215 LBS

## 2023-11-13 DIAGNOSIS — H69.93 UNSPECIFIED EUSTACHIAN TUBE DISORDER, BILATERAL: ICD-10-CM

## 2023-11-13 DIAGNOSIS — H90.3 SENSORINEURAL HEARING LOSS, BILATERAL: ICD-10-CM

## 2023-11-13 PROCEDURE — 92567 TYMPANOMETRY: CPT

## 2023-11-13 PROCEDURE — 92557 COMPREHENSIVE HEARING TEST: CPT

## 2023-11-13 PROCEDURE — 99203 OFFICE O/P NEW LOW 30 MIN: CPT

## 2023-12-30 RX ORDER — FLUTICASONE FUROATE 100 UG/1
100 POWDER RESPIRATORY (INHALATION) DAILY
Qty: 1 | Refills: 11 | Status: ACTIVE | COMMUNITY
Start: 2021-08-16 | End: 1900-01-01

## 2024-02-09 ENCOUNTER — APPOINTMENT (OUTPATIENT)
Dept: DERMATOLOGY | Facility: CLINIC | Age: 64
End: 2024-02-09
Payer: MEDICARE

## 2024-02-09 PROCEDURE — 99204 OFFICE O/P NEW MOD 45 MIN: CPT

## 2024-02-15 ENCOUNTER — OUTPATIENT (OUTPATIENT)
Dept: OUTPATIENT SERVICES | Facility: HOSPITAL | Age: 64
LOS: 1 days | Discharge: HOME | End: 2024-02-15
Payer: MEDICARE

## 2024-02-15 VITALS
DIASTOLIC BLOOD PRESSURE: 65 MMHG | HEART RATE: 72 BPM | OXYGEN SATURATION: 96 % | SYSTOLIC BLOOD PRESSURE: 117 MMHG | HEIGHT: 74 IN | RESPIRATION RATE: 16 BRPM | WEIGHT: 220.02 LBS | TEMPERATURE: 98 F

## 2024-02-15 DIAGNOSIS — K08.409 PARTIAL LOSS OF TEETH, UNSPECIFIED CAUSE, UNSPECIFIED CLASS: Chronic | ICD-10-CM

## 2024-02-15 DIAGNOSIS — R13.10 DYSPHAGIA, UNSPECIFIED: ICD-10-CM

## 2024-02-15 DIAGNOSIS — Z86.010 PERSONAL HISTORY OF COLONIC POLYPS: ICD-10-CM

## 2024-02-15 DIAGNOSIS — Z90.89 ACQUIRED ABSENCE OF OTHER ORGANS: Chronic | ICD-10-CM

## 2024-02-15 DIAGNOSIS — Z98.890 OTHER SPECIFIED POSTPROCEDURAL STATES: Chronic | ICD-10-CM

## 2024-02-15 PROCEDURE — 88305 TISSUE EXAM BY PATHOLOGIST: CPT | Mod: 26

## 2024-02-15 PROCEDURE — 88342 IMHCHEM/IMCYTCHM 1ST ANTB: CPT

## 2024-02-15 PROCEDURE — 88313 SPECIAL STAINS GROUP 2: CPT

## 2024-02-15 PROCEDURE — 88312 SPECIAL STAINS GROUP 1: CPT | Mod: 26

## 2024-02-15 PROCEDURE — 88313 SPECIAL STAINS GROUP 2: CPT | Mod: 26

## 2024-02-15 PROCEDURE — 88312 SPECIAL STAINS GROUP 1: CPT

## 2024-02-15 PROCEDURE — 88342 IMHCHEM/IMCYTCHM 1ST ANTB: CPT | Mod: 26

## 2024-02-15 PROCEDURE — 88305 TISSUE EXAM BY PATHOLOGIST: CPT

## 2024-02-15 RX ORDER — FAMOTIDINE 10 MG/ML
1 INJECTION INTRAVENOUS
Qty: 0 | Refills: 0 | DISCHARGE

## 2024-02-15 RX ORDER — ALFUZOSIN HYDROCHLORIDE 10 MG/1
1 TABLET, EXTENDED RELEASE ORAL
Qty: 0 | Refills: 0 | DISCHARGE

## 2024-02-15 RX ORDER — FLUTICASONE PROPIONATE 220 MCG
1 AEROSOL WITH ADAPTER (GRAM) INHALATION
Refills: 0 | DISCHARGE

## 2024-02-15 RX ORDER — MYCOPHENOLATE MOFETIL 250 MG/1
3 CAPSULE ORAL
Refills: 0 | DISCHARGE

## 2024-02-15 RX ORDER — HYDROXYZINE HCL 10 MG
1 TABLET ORAL
Refills: 0 | DISCHARGE

## 2024-02-15 RX ORDER — DOXAZOSIN MESYLATE 4 MG
1 TABLET ORAL
Refills: 0 | DISCHARGE

## 2024-02-15 RX ORDER — ATORVASTATIN CALCIUM 80 MG/1
1 TABLET, FILM COATED ORAL
Qty: 0 | Refills: 0 | DISCHARGE

## 2024-02-15 NOTE — ASU PREOP CHECKLIST - LOOSE TEETH
Subjective


Date of Service: 08/21/21


Chief Complaint: Dehydration, diarrhea, JAREK


Subjective: Doing well (patient is doing well. Denies N/V/D.)





<Bekah De Luna - Last Filed: 08/21/21 10:01>


Date of Service: 08/22/21





<Juan Carlos Limon - Last Filed: 08/22/21 15:27>





Physical Examination





- Vital Signs


Temperature: 97.8 F


Blood Pressure: 135/83


Pulse: 79


Respirations: 20


Pulse Ox (%): 98





- Physical Exam


General: Alert, In no apparent distress, Oriented x3


HEENT: Atraumatic, Normocephalic, PERRLA, Mucous membr. moist/pink


Neck: Supple, Without JVD or thyroid abnormality


Respiratory: Clear to auscultation bilaterally, Normal air movement


Cardiovascular: No edema, Normal pulses, Regular rate/rhythm


Gastrointestinal: Normal bowel sounds, No tenderness, No masses


Musculoskeletal: No clubbing, No swelling, No tenderness


Neurological: Normal gait, Normal speech, Normal strength at 5/5 x4 extr





<Bekah De Luna - Last Filed: 08/21/21 10:01>





Assessment And Plan


Discharge Plan: Home


Plan to discharge in: 72 Hours


Physician Review: Patient Assessed, Agree with Above Assessment and Plan


Physician Review Additional Text: 





- Plan


JAREK on CKD stage 4:


His creatinine is improved at 1.9 today.


we will continue IV fluid and monitoring of renal labs.


we will avoid exposure to nephrotoxins.





Diarrhea:


Improving but still an issues. awaiting C diff labs.


We will ontinue empiric antibiotics.





COPD:


we will continue outpt inhalation therapy.





Chronic A fib:


we will continue rate control meds and anticoagulkant therapy.





Multiple myeloma:


we will continue revlimid.





Hypokalemia:


Potassium is low at 2.9. we will replete and monitor.





Hypomagnesemia:


magnesium is low at 1.7. we will replete and monitor.





Hypernatremia: likely 2/2 dehydration. Continue IV fluids and monitor. 





UTI: culture pending. gram negative rods identified on preliminary test. IV abx 

ordered. continue to monitor for now 





Hypocalcemia:


Calcium is low at 6.9 but corrected for hypoalbuminemia, its 7.4. 


we will dose 2g of calcium gluconate one time dose and continue routine 

monitoring. 


Physician Review: Patient Assessed, Agree with Above Assessment and Plan





<Bekah De Luna - Last Filed: 08/21/21 10:01>


Date of Service: 08/22/21


Subjective:


PLAN TO DC HOME TODAY.  PLEASE SEE DISCHARGE SUMMARY





Physical Examination:


Vitals:  Afebrile vital signs are stable


Physical exam:


Cardiovascular:  Within normal limits.


Lungs:  Within normal limits


Abdomen:  Within normal limits


Neuro:  Awake, alert, oriented to person place and time





Assessment:


1.ACUTE KIDNEY INJURY


2. ELECTROLYTE ABNORMALITIES





Plan:


1. HEP-LOCK IV


2. CORRECT ELECTROLYTES


3. ADVANCED DIET


4. GI AND DVT PROPHYLAXIS





Continue with current plan of care





   





<Juan Carlos Limon - Last Filed: 08/22/21 15:27> R upper tooth having dental work done./yes

## 2024-02-15 NOTE — ASU PATIENT PROFILE, ADULT - NSICDXPASTSURGICALHX_GEN_ALL_CORE_FT
PAST SURGICAL HISTORY:  H/O tooth extraction     History of tonsillectomy     Status post endovenous radiofrequency ablation of saphenous vein

## 2024-02-15 NOTE — ASU PATIENT PROFILE, ADULT - NSICDXPASTMEDICALHX_GEN_ALL_CORE_FT
PAST MEDICAL HISTORY:  Arthritis left knee    Deep vein thrombosis (DVT)     Dyslipidemia     Esophageal stricture     GERD (gastroesophageal reflux disease)     Nevus left upper back    Peyronie's disease     Pulmonary embolus     RAD (reactive airway disease)

## 2024-02-22 ENCOUNTER — APPOINTMENT (OUTPATIENT)
Dept: INTERNAL MEDICINE | Facility: CLINIC | Age: 64
End: 2024-02-22
Payer: MEDICARE

## 2024-02-22 VITALS
WEIGHT: 220 LBS | DIASTOLIC BLOOD PRESSURE: 68 MMHG | HEIGHT: 73 IN | RESPIRATION RATE: 16 BRPM | HEART RATE: 52 BPM | SYSTOLIC BLOOD PRESSURE: 108 MMHG | OXYGEN SATURATION: 95 % | TEMPERATURE: 97.7 F | BODY MASS INDEX: 29.16 KG/M2

## 2024-02-22 DIAGNOSIS — I26.99 OTHER PULMONARY EMBOLISM W/OUT ACUTE COR PULMONALE: ICD-10-CM

## 2024-02-22 DIAGNOSIS — J45.30 MILD PERSISTENT ASTHMA, UNCOMPLICATED: ICD-10-CM

## 2024-02-22 DIAGNOSIS — J45.909 UNSPECIFIED ASTHMA, UNCOMPLICATED: ICD-10-CM

## 2024-02-22 DIAGNOSIS — R05.9 COUGH, UNSPECIFIED: ICD-10-CM

## 2024-02-22 LAB — SURGICAL PATHOLOGY STUDY: SIGNIFICANT CHANGE UP

## 2024-02-22 PROCEDURE — 94060 EVALUATION OF WHEEZING: CPT

## 2024-02-22 PROCEDURE — ZZZZZ: CPT

## 2024-02-22 PROCEDURE — 94729 DIFFUSING CAPACITY: CPT

## 2024-02-22 PROCEDURE — 99215 OFFICE O/P EST HI 40 MIN: CPT | Mod: 25

## 2024-02-22 PROCEDURE — 94727 GAS DIL/WSHOT DETER LNG VOL: CPT

## 2024-02-22 RX ORDER — ALBUTEROL SULFATE 90 UG/1
108 (90 BASE) INHALANT RESPIRATORY (INHALATION)
Qty: 1 | Refills: 5 | Status: ACTIVE | COMMUNITY
Start: 2024-02-22 | End: 1900-01-01

## 2024-02-22 NOTE — PLAN
[FreeTextEntry1] :  1. Continue current medications as outlined above.  2. The patient has been given an Albuterol rescue inhaler for as needed use for rescue purposes only  3. Follow up in with me in 6 months with prepost spirometry and O2 saturation.   4. Continue to follow with dermatologist regarding monitoring of his bullous pemphigoid.  They will dictate the tapering of his prednisone.  5. Maintain exercise regimen as tolerated, add cardiovascular regimen.   6.  Routine medical follow-up with his primary care physician, Dr. Boxer.

## 2024-02-22 NOTE — DATA REVIEWED
[FreeTextEntry1] : A pulmonary function test is performed.  Lung volumes and flow rates are within normal limits.  Mild to moderate bronchodilator reactivity is demonstrated.  The DLCO and saturation are maintained.  This represents normal physiologic function with airway reactivity.  When compared to the prior study, the flow rates have diminished slightly.  Of note is the fact that the patient has gained 12 pounds since the last evaluation.

## 2024-02-22 NOTE — HISTORY OF PRESENT ILLNESS
[FreeTextEntry1] :  The patient comes in for a comprehensive pulmonary evaluation. [de-identified] : The patient states that, from a pulmonary standpoint, he has been doing reasonably well. He has a history of underlying asthma for which he continues on Arnuity 1 puff daily. He also has a history of a pulmonary embolism, dating back approximately 3 years ago.   There has been no cough, sputum production, or wheeze.  There have been no recent exacerbations of the patient's asthma. He has not used a rescue inhaler. Patient states that he does bike as a form of exercise.  He was also diagnosed with bullous pemphigoid, for which his dermatologist prescribed Cellcept 1500 mg BID. He also takes Prednisone for this disease, however, his dosage was changed from 60 mg down to 10 mg. Unfortunately, he continued to experience blisters and eczema, thus his dosage was raised back to 20 mg.  He is now alternating 10 and 20 mg every other day. There have been no other acute constitutional symptoms. He comes in for this assessment.

## 2024-02-22 NOTE — PHYSICAL EXAM
[No Acute Distress] : no acute distress [Well Nourished] : well nourished [Well Developed] : well developed [Well-Appearing] : well-appearing [Normal Sclera/Conjunctiva] : normal sclera/conjunctiva [EOMI] : extraocular movements intact [Normal Oropharynx] : the oropharynx was normal [No JVD] : no jugular venous distention [Supple] : supple [No Respiratory Distress] : no respiratory distress  [No Accessory Muscle Use] : no accessory muscle use [Clear to Auscultation] : lungs were clear to auscultation bilaterally [Normal Rate] : normal rate  [Regular Rhythm] : with a regular rhythm [Normal S1, S2] : normal S1 and S2 [No Murmur] : no murmur heard [No Edema] : there was no peripheral edema [No Extremity Clubbing/Cyanosis] : no extremity clubbing/cyanosis [Soft] : abdomen soft [Non Tender] : non-tender [Non-distended] : non-distended [No Masses] : no abdominal mass palpated [No HSM] : no HSM [Normal Bowel Sounds] : normal bowel sounds [Normal Supraclavicular Nodes] : no supraclavicular lymphadenopathy [Normal Posterior Cervical Nodes] : no posterior cervical lymphadenopathy [Normal Anterior Cervical Nodes] : no anterior cervical lymphadenopathy [No CVA Tenderness] : no CVA  tenderness [No Spinal Tenderness] : no spinal tenderness [Coordination Grossly Intact] : coordination grossly intact [Normal Gait] : normal gait [Normal Affect] : the affect was normal [Normal Insight/Judgement] : insight and judgment were intact [de-identified] : There are several red, raised lesions on his arms, upper torso, and face [de-identified] : There appears to be a palpable cord in the right upper medial thigh region.

## 2024-02-23 DIAGNOSIS — R13.10 DYSPHAGIA, UNSPECIFIED: ICD-10-CM

## 2024-02-23 DIAGNOSIS — E78.5 HYPERLIPIDEMIA, UNSPECIFIED: ICD-10-CM

## 2024-02-23 DIAGNOSIS — K63.89 OTHER SPECIFIED DISEASES OF INTESTINE: ICD-10-CM

## 2024-02-23 DIAGNOSIS — Z86.711 PERSONAL HISTORY OF PULMONARY EMBOLISM: ICD-10-CM

## 2024-02-23 DIAGNOSIS — Z80.0 FAMILY HISTORY OF MALIGNANT NEOPLASM OF DIGESTIVE ORGANS: ICD-10-CM

## 2024-02-23 DIAGNOSIS — K21.00 GASTRO-ESOPHAGEAL REFLUX DISEASE WITH ESOPHAGITIS, WITHOUT BLEEDING: ICD-10-CM

## 2024-02-23 DIAGNOSIS — Z88.1 ALLERGY STATUS TO OTHER ANTIBIOTIC AGENTS STATUS: ICD-10-CM

## 2024-02-23 DIAGNOSIS — K29.50 UNSPECIFIED CHRONIC GASTRITIS WITHOUT BLEEDING: ICD-10-CM

## 2024-02-23 DIAGNOSIS — K21.9 GASTRO-ESOPHAGEAL REFLUX DISEASE WITHOUT ESOPHAGITIS: ICD-10-CM

## 2024-02-23 DIAGNOSIS — I10 ESSENTIAL (PRIMARY) HYPERTENSION: ICD-10-CM

## 2024-02-23 DIAGNOSIS — J45.909 UNSPECIFIED ASTHMA, UNCOMPLICATED: ICD-10-CM

## 2024-02-23 DIAGNOSIS — K64.0 FIRST DEGREE HEMORRHOIDS: ICD-10-CM

## 2024-02-23 DIAGNOSIS — Z12.11 ENCOUNTER FOR SCREENING FOR MALIGNANT NEOPLASM OF COLON: ICD-10-CM

## 2024-02-23 DIAGNOSIS — K22.70 BARRETT'S ESOPHAGUS WITHOUT DYSPLASIA: ICD-10-CM

## 2024-02-23 DIAGNOSIS — K57.30 DIVERTICULOSIS OF LARGE INTESTINE WITHOUT PERFORATION OR ABSCESS WITHOUT BLEEDING: ICD-10-CM

## 2024-02-23 DIAGNOSIS — Z86.718 PERSONAL HISTORY OF OTHER VENOUS THROMBOSIS AND EMBOLISM: ICD-10-CM

## 2024-02-23 DIAGNOSIS — K31.7 POLYP OF STOMACH AND DUODENUM: ICD-10-CM

## 2024-02-23 DIAGNOSIS — Z86.010 PERSONAL HISTORY OF COLONIC POLYPS: ICD-10-CM

## 2024-04-01 PROBLEM — I82.409 ACUTE EMBOLISM AND THROMBOSIS OF UNSPECIFIED DEEP VEINS OF UNSPECIFIED LOWER EXTREMITY: Chronic | Status: ACTIVE | Noted: 2024-02-15

## 2024-04-01 PROBLEM — I26.99 OTHER PULMONARY EMBOLISM WITHOUT ACUTE COR PULMONALE: Chronic | Status: ACTIVE | Noted: 2024-02-15

## 2024-04-01 PROBLEM — J45.909 UNSPECIFIED ASTHMA, UNCOMPLICATED: Chronic | Status: ACTIVE | Noted: 2024-02-15

## 2024-06-10 ENCOUNTER — APPOINTMENT (OUTPATIENT)
Dept: DERMATOLOGY | Facility: CLINIC | Age: 64
End: 2024-06-10
Payer: MEDICARE

## 2024-06-10 DIAGNOSIS — Z79.899 OTHER LONG TERM (CURRENT) DRUG THERAPY: ICD-10-CM

## 2024-06-10 DIAGNOSIS — L12.0 BULLOUS PEMPHIGOID: ICD-10-CM

## 2024-06-10 PROCEDURE — 99214 OFFICE O/P EST MOD 30 MIN: CPT

## 2024-06-10 NOTE — HISTORY OF PRESENT ILLNESS
[FreeTextEntry1] : f/u BP [de-identified] : PASCUAL MONTGOMERY is a 64-year-old male who presents for 2nd opinion of Bullous pemphigoid (Referred by Dr. Davenport, symptoms since Feb 2022, biopsy diagnosis in March 2023). Currently on MMF 1500mg BID. Also on pred for >1 year, now with osteopenia -- currently taking 7mg QD. Notes episodic blisters and itching but has been doing better the last two days.   Tried dupixent prior to diagnosis of BP but not in conjunction with MMF

## 2024-06-10 NOTE — ASSESSMENT
[FreeTextEntry1] : Bullous pemphigoid, chronic, not at treatment goal, currently on MMF and prednisone Discussed nature, chronicity and unpredictable course Reviewed additional tx options: rituximab, IVIG, dupixent (adjuvant).  Last  IgG in April 2024 - 19 (<20) Notes overall improvement but still on chronic prednisone in addition to MMF Tx options discussed Given desire to downtitrate prednisone as much as possible given long-term use, we discussed therapy with Rituxan as our best option for disease remission. After discussion, he elects to move forward For now, c/w MMF 1.5gm BID Pending Hep Serologies will submit auth for Rituxan Recommend endocrinology consult for assistance in tapering steroids, especially due to risk of adrenal suppression   High risk medication use  - 19 (4/2024) -- patient reports he had another level drawn today CBC, CMP wnl in 5/2024 Quantiferon negative in 4/2024 Patient reports having completed Hep Serologies this year at [facility] -- pending results will proceed with Rituxan  RTC 4 weeks after 2nd Rituxan infusion

## 2024-06-10 NOTE — PHYSICAL EXAM
[Alert] : alert [Oriented x 3] : ~L oriented x 3 [Well Nourished] : well nourished [FreeTextEntry3] : Focused skin exam performed  The relevant portions of the exam were performed today, notable for: scattered few pink papules and plaques on the arms and trunk and thin scaly pink plaques

## 2024-06-12 PROBLEM — Z79.899 HIGH RISK MEDICATION USE: Status: ACTIVE | Noted: 2024-02-09

## 2024-06-12 PROBLEM — L12.0 BULLOUS PEMPHIGOID: Status: ACTIVE | Noted: 2023-04-05

## 2024-12-01 ENCOUNTER — NON-APPOINTMENT (OUTPATIENT)
Age: 64
End: 2024-12-01

## 2025-01-27 ENCOUNTER — RX RENEWAL (OUTPATIENT)
Age: 65
End: 2025-01-27

## 2025-02-24 ENCOUNTER — APPOINTMENT (OUTPATIENT)
Dept: INTERNAL MEDICINE | Facility: CLINIC | Age: 65
End: 2025-02-24
Payer: MEDICARE

## 2025-02-24 VITALS
HEART RATE: 66 BPM | OXYGEN SATURATION: 98 % | WEIGHT: 215 LBS | RESPIRATION RATE: 14 BRPM | SYSTOLIC BLOOD PRESSURE: 108 MMHG | TEMPERATURE: 98 F | BODY MASS INDEX: 28.37 KG/M2 | DIASTOLIC BLOOD PRESSURE: 71 MMHG

## 2025-02-24 VITALS
HEIGHT: 73 IN | BODY MASS INDEX: 28.49 KG/M2 | SYSTOLIC BLOOD PRESSURE: 108 MMHG | RESPIRATION RATE: 16 BRPM | DIASTOLIC BLOOD PRESSURE: 71 MMHG | TEMPERATURE: 98 F | HEART RATE: 66 BPM | WEIGHT: 215 LBS | OXYGEN SATURATION: 98 %

## 2025-02-24 DIAGNOSIS — I26.99 OTHER PULMONARY EMBOLISM W/OUT ACUTE COR PULMONALE: ICD-10-CM

## 2025-02-24 DIAGNOSIS — R05.9 COUGH, UNSPECIFIED: ICD-10-CM

## 2025-02-24 DIAGNOSIS — J45.30 MILD PERSISTENT ASTHMA, UNCOMPLICATED: ICD-10-CM

## 2025-02-24 DIAGNOSIS — J45.909 UNSPECIFIED ASTHMA, UNCOMPLICATED: ICD-10-CM

## 2025-02-24 DIAGNOSIS — Z23 ENCOUNTER FOR IMMUNIZATION: ICD-10-CM

## 2025-02-24 PROCEDURE — ZZZZZ: CPT

## 2025-02-24 PROCEDURE — 94060 EVALUATION OF WHEEZING: CPT

## 2025-02-24 PROCEDURE — 94727 GAS DIL/WSHOT DETER LNG VOL: CPT

## 2025-02-24 PROCEDURE — 99215 OFFICE O/P EST HI 40 MIN: CPT | Mod: 25

## 2025-02-24 PROCEDURE — 94729 DIFFUSING CAPACITY: CPT

## 2025-02-24 RX ORDER — MYCOPHENOLATE MOFETIL 500 MG/1
500 TABLET, FILM COATED ORAL TWICE DAILY
Refills: 0 | Status: ACTIVE | COMMUNITY

## 2025-03-20 ENCOUNTER — OUTPATIENT (OUTPATIENT)
Dept: OUTPATIENT SERVICES | Facility: HOSPITAL | Age: 65
LOS: 1 days | Discharge: HOME | End: 2025-03-20

## 2025-03-20 VITALS
HEART RATE: 95 BPM | RESPIRATION RATE: 19 BRPM | DIASTOLIC BLOOD PRESSURE: 71 MMHG | HEIGHT: 74 IN | SYSTOLIC BLOOD PRESSURE: 111 MMHG | OXYGEN SATURATION: 97 % | TEMPERATURE: 97 F | WEIGHT: 209 LBS

## 2025-03-20 DIAGNOSIS — Z86.0100 PERSONAL HISTORY OF COLON POLYPS, UNSPECIFIED: ICD-10-CM

## 2025-03-20 DIAGNOSIS — K08.409 PARTIAL LOSS OF TEETH, UNSPECIFIED CAUSE, UNSPECIFIED CLASS: Chronic | ICD-10-CM

## 2025-03-20 DIAGNOSIS — Z98.890 OTHER SPECIFIED POSTPROCEDURAL STATES: Chronic | ICD-10-CM

## 2025-03-20 DIAGNOSIS — Z90.89 ACQUIRED ABSENCE OF OTHER ORGANS: Chronic | ICD-10-CM

## 2025-03-20 PROCEDURE — 88305 TISSUE EXAM BY PATHOLOGIST: CPT

## 2025-03-20 PROCEDURE — 88313 SPECIAL STAINS GROUP 2: CPT | Mod: 26

## 2025-03-20 PROCEDURE — 88305 TISSUE EXAM BY PATHOLOGIST: CPT | Mod: 26

## 2025-03-20 PROCEDURE — 88312 SPECIAL STAINS GROUP 1: CPT | Mod: 26

## 2025-03-20 PROCEDURE — 88312 SPECIAL STAINS GROUP 1: CPT

## 2025-03-20 PROCEDURE — 88313 SPECIAL STAINS GROUP 2: CPT

## 2025-03-20 NOTE — ASU PATIENT PROFILE, ADULT - FALL HARM RISK - UNIVERSAL INTERVENTIONS
Bed in lowest position, wheels locked, appropriate side rails in place/Call bell, personal items and telephone in reach/Instruct patient to call for assistance before getting out of bed or chair/Non-slip footwear when patient is out of bed/[place] to call system/Physically safe environment - no spills, clutter or unnecessary equipment/Purposeful Proactive Rounding/Room/bathroom lighting operational, light cord in reach

## 2025-03-25 LAB — SURGICAL PATHOLOGY STUDY: SIGNIFICANT CHANGE UP

## 2025-04-15 ENCOUNTER — NON-APPOINTMENT (OUTPATIENT)
Age: 65
End: 2025-04-15

## 2025-04-16 ENCOUNTER — NON-APPOINTMENT (OUTPATIENT)
Age: 65
End: 2025-04-16

## 2025-04-16 ENCOUNTER — APPOINTMENT (OUTPATIENT)
Dept: ORTHOPEDIC SURGERY | Facility: CLINIC | Age: 65
End: 2025-04-16
Payer: MEDICARE

## 2025-04-16 VITALS — BODY MASS INDEX: 26.95 KG/M2 | WEIGHT: 210 LBS | HEIGHT: 74 IN

## 2025-04-16 DIAGNOSIS — M67.88 OTHER SPECIFIED DISORDERS OF SYNOVIUM AND TENDON, OTHER SITE: ICD-10-CM

## 2025-04-16 DIAGNOSIS — M25.572 PAIN IN LEFT ANKLE AND JOINTS OF LEFT FOOT: ICD-10-CM

## 2025-04-16 PROCEDURE — 99204 OFFICE O/P NEW MOD 45 MIN: CPT

## 2025-04-16 PROCEDURE — 73610 X-RAY EXAM OF ANKLE: CPT | Mod: LT

## 2025-04-16 NOTE — BRIEF OPERATIVE NOTE - VENOUS THROMBOEMBOLISM PROPHYLAXIS THERAPY
[Pathological] : TNM Stage: p [0] : 0 [TTNM] : is [NTNM] : 0 [MTNM] : 0 [de-identified] : 2602cGy [de-identified] : 2608cZc [de-identified] : Right breast Subcutaneous Heparin, PAS stockings, ambulation

## 2025-06-06 ENCOUNTER — APPOINTMENT (OUTPATIENT)
Dept: ORTHOPEDIC SURGERY | Facility: CLINIC | Age: 65
End: 2025-06-06
Payer: MEDICARE

## 2025-06-06 PROCEDURE — 99213 OFFICE O/P EST LOW 20 MIN: CPT
